# Patient Record
Sex: FEMALE | Race: WHITE | NOT HISPANIC OR LATINO | URBAN - METROPOLITAN AREA
[De-identification: names, ages, dates, MRNs, and addresses within clinical notes are randomized per-mention and may not be internally consistent; named-entity substitution may affect disease eponyms.]

---

## 2017-05-02 ENCOUNTER — OFFICE (AMBULATORY)
Dept: URBAN - METROPOLITAN AREA CLINIC 64 | Facility: CLINIC | Age: 37
End: 2017-05-02

## 2017-05-02 VITALS
WEIGHT: 123 LBS | HEIGHT: 63 IN | DIASTOLIC BLOOD PRESSURE: 64 MMHG | SYSTOLIC BLOOD PRESSURE: 107 MMHG | HEART RATE: 71 BPM

## 2017-05-02 DIAGNOSIS — K51.90 ULCERATIVE COLITIS, UNSPECIFIED, WITHOUT COMPLICATIONS: ICD-10-CM

## 2017-05-02 PROCEDURE — 99213 OFFICE O/P EST LOW 20 MIN: CPT | Performed by: INTERNAL MEDICINE

## 2019-04-16 ENCOUNTER — OFFICE (AMBULATORY)
Dept: URBAN - METROPOLITAN AREA CLINIC 64 | Facility: CLINIC | Age: 39
End: 2019-04-16

## 2019-04-16 VITALS
WEIGHT: 139 LBS | SYSTOLIC BLOOD PRESSURE: 118 MMHG | DIASTOLIC BLOOD PRESSURE: 77 MMHG | HEART RATE: 67 BPM | HEIGHT: 63 IN

## 2019-04-16 DIAGNOSIS — K51.90 ULCERATIVE COLITIS, UNSPECIFIED, WITHOUT COMPLICATIONS: ICD-10-CM

## 2019-04-16 PROCEDURE — 99214 OFFICE O/P EST MOD 30 MIN: CPT | Performed by: NURSE PRACTITIONER

## 2019-04-24 ENCOUNTER — ON CAMPUS - OUTPATIENT (AMBULATORY)
Dept: URBAN - METROPOLITAN AREA HOSPITAL 2 | Facility: HOSPITAL | Age: 39
End: 2019-04-24

## 2019-04-24 ENCOUNTER — OFFICE (AMBULATORY)
Dept: URBAN - METROPOLITAN AREA PATHOLOGY 4 | Facility: PATHOLOGY | Age: 39
End: 2019-04-24

## 2019-04-24 VITALS
HEART RATE: 92 BPM | HEART RATE: 82 BPM | SYSTOLIC BLOOD PRESSURE: 100 MMHG | DIASTOLIC BLOOD PRESSURE: 64 MMHG | RESPIRATION RATE: 18 BRPM | DIASTOLIC BLOOD PRESSURE: 66 MMHG | RESPIRATION RATE: 16 BRPM | TEMPERATURE: 97.9 F | DIASTOLIC BLOOD PRESSURE: 82 MMHG | HEART RATE: 68 BPM | HEART RATE: 75 BPM | HEART RATE: 78 BPM | HEART RATE: 79 BPM | DIASTOLIC BLOOD PRESSURE: 73 MMHG | DIASTOLIC BLOOD PRESSURE: 74 MMHG | SYSTOLIC BLOOD PRESSURE: 115 MMHG | SYSTOLIC BLOOD PRESSURE: 135 MMHG | HEART RATE: 89 BPM | SYSTOLIC BLOOD PRESSURE: 137 MMHG | OXYGEN SATURATION: 100 % | SYSTOLIC BLOOD PRESSURE: 112 MMHG | OXYGEN SATURATION: 98 % | HEIGHT: 63 IN | OXYGEN SATURATION: 97 % | SYSTOLIC BLOOD PRESSURE: 122 MMHG | SYSTOLIC BLOOD PRESSURE: 134 MMHG | WEIGHT: 137 LBS

## 2019-04-24 DIAGNOSIS — K51.90 ULCERATIVE COLITIS, UNSPECIFIED, WITHOUT COMPLICATIONS: ICD-10-CM

## 2019-04-24 DIAGNOSIS — K63.5 POLYP OF COLON: ICD-10-CM

## 2019-04-24 DIAGNOSIS — K64.1 SECOND DEGREE HEMORRHOIDS: ICD-10-CM

## 2019-04-24 PROBLEM — D12.4 BENIGN NEOPLASM OF DESCENDING COLON: Status: ACTIVE | Noted: 2019-04-24

## 2019-04-24 LAB
GI HISTOLOGY: A. SELECT: (no result)
GI HISTOLOGY: B. SELECT: (no result)
GI HISTOLOGY: C. UNSPECIFIED: (no result)
GI HISTOLOGY: PDF REPORT: (no result)

## 2019-04-24 PROCEDURE — 88305 TISSUE EXAM BY PATHOLOGIST: CPT | Performed by: INTERNAL MEDICINE

## 2019-04-24 PROCEDURE — 45380 COLONOSCOPY AND BIOPSY: CPT | Performed by: INTERNAL MEDICINE

## 2020-12-07 ENCOUNTER — OFFICE (AMBULATORY)
Dept: URBAN - METROPOLITAN AREA CLINIC 64 | Facility: CLINIC | Age: 40
End: 2020-12-07
Payer: COMMERCIAL

## 2020-12-07 VITALS
SYSTOLIC BLOOD PRESSURE: 130 MMHG | HEART RATE: 93 BPM | WEIGHT: 164 LBS | HEIGHT: 63 IN | DIASTOLIC BLOOD PRESSURE: 86 MMHG

## 2020-12-07 DIAGNOSIS — K52.89 OTHER SPECIFIED NONINFECTIVE GASTROENTERITIS AND COLITIS: ICD-10-CM

## 2020-12-07 DIAGNOSIS — K51.90 ULCERATIVE COLITIS, UNSPECIFIED, WITHOUT COMPLICATIONS: ICD-10-CM

## 2020-12-07 DIAGNOSIS — R10.30 LOWER ABDOMINAL PAIN, UNSPECIFIED: ICD-10-CM

## 2020-12-07 PROCEDURE — 99213 OFFICE O/P EST LOW 20 MIN: CPT | Performed by: NURSE PRACTITIONER

## 2020-12-07 RX ORDER — MESALAMINE 1000 MG/1
1 SUPPOSITORY RECTAL
Qty: 30 | Refills: 2 | Status: COMPLETED
Start: 2020-12-07 | End: 2022-08-01

## 2022-08-01 ENCOUNTER — OFFICE (AMBULATORY)
Dept: URBAN - METROPOLITAN AREA CLINIC 64 | Facility: CLINIC | Age: 42
End: 2022-08-01

## 2022-08-01 VITALS
HEART RATE: 81 BPM | SYSTOLIC BLOOD PRESSURE: 140 MMHG | WEIGHT: 149 LBS | DIASTOLIC BLOOD PRESSURE: 84 MMHG | HEIGHT: 63 IN

## 2022-08-01 DIAGNOSIS — K51.90 ULCERATIVE COLITIS, UNSPECIFIED, WITHOUT COMPLICATIONS: ICD-10-CM

## 2022-08-01 PROCEDURE — 99214 OFFICE O/P EST MOD 30 MIN: CPT

## 2022-08-01 RX ORDER — MESALAMINE 1000 MG/1
1 SUPPOSITORY RECTAL
Qty: 30 | Refills: 1 | Status: COMPLETED
Start: 2022-08-01 | End: 2023-01-13

## 2022-08-08 ENCOUNTER — APPOINTMENT (OUTPATIENT)
Dept: CT IMAGING | Facility: HOSPITAL | Age: 42
End: 2022-08-08

## 2022-08-08 ENCOUNTER — HOSPITAL ENCOUNTER (INPATIENT)
Facility: HOSPITAL | Age: 42
LOS: 1 days | Discharge: HOME | End: 2022-08-12
Attending: EMERGENCY MEDICINE | Admitting: INTERNAL MEDICINE

## 2022-08-08 DIAGNOSIS — K92.1 MELENA: ICD-10-CM

## 2022-08-08 DIAGNOSIS — R50.9 FEVER, UNSPECIFIED FEVER CAUSE: ICD-10-CM

## 2022-08-08 DIAGNOSIS — K52.9 COLITIS, ACUTE: ICD-10-CM

## 2022-08-08 DIAGNOSIS — R10.32 LLQ ABDOMINAL PAIN: Primary | ICD-10-CM

## 2022-08-08 LAB
ADV 40+41 DNA STL QL NAA+NON-PROBE: NOT DETECTED
ALBUMIN SERPL-MCNC: 4 G/DL (ref 3.5–5.2)
ALBUMIN/GLOB SERPL: 1 G/DL
ALP SERPL-CCNC: 91 U/L (ref 39–117)
ALT SERPL W P-5'-P-CCNC: 15 U/L (ref 1–33)
ANION GAP SERPL CALCULATED.3IONS-SCNC: 11 MMOL/L (ref 5–15)
AST SERPL-CCNC: 13 U/L (ref 1–32)
ASTRO TYP 1-8 RNA STL QL NAA+NON-PROBE: NOT DETECTED
BACTERIA UR QL AUTO: ABNORMAL /HPF
BASOPHILS # BLD AUTO: 0.1 10*3/MM3 (ref 0–0.2)
BASOPHILS NFR BLD AUTO: 0.3 % (ref 0–1.5)
BILIRUB SERPL-MCNC: 0.3 MG/DL (ref 0–1.2)
BILIRUB UR QL STRIP: NEGATIVE
BUN SERPL-MCNC: 6 MG/DL (ref 6–20)
BUN/CREAT SERPL: 7.8 (ref 7–25)
C CAYETANENSIS DNA STL QL NAA+NON-PROBE: NOT DETECTED
C COLI+JEJ+UPSA DNA STL QL NAA+NON-PROBE: NOT DETECTED
CALCIUM SPEC-SCNC: 9.5 MG/DL (ref 8.6–10.5)
CHLORIDE SERPL-SCNC: 98 MMOL/L (ref 98–107)
CLARITY UR: CLEAR
CO2 SERPL-SCNC: 27 MMOL/L (ref 22–29)
COLOR UR: YELLOW
CREAT SERPL-MCNC: 0.77 MG/DL (ref 0.57–1)
CRP SERPL-MCNC: 13.4 MG/DL (ref 0–0.5)
CRYPTOSP DNA STL QL NAA+NON-PROBE: NOT DETECTED
D-LACTATE SERPL-SCNC: 0.5 MMOL/L (ref 0.5–2)
DEPRECATED RDW RBC AUTO: 39.4 FL (ref 37–54)
E HISTOLYT DNA STL QL NAA+NON-PROBE: NOT DETECTED
EAEC PAA PLAS AGGR+AATA ST NAA+NON-PRB: NOT DETECTED
EC STX1+STX2 GENES STL QL NAA+NON-PROBE: NOT DETECTED
EGFRCR SERPLBLD CKD-EPI 2021: 99.5 ML/MIN/1.73
EOSINOPHIL # BLD AUTO: 0 10*3/MM3 (ref 0–0.4)
EOSINOPHIL NFR BLD AUTO: 0.2 % (ref 0.3–6.2)
EPEC EAE GENE STL QL NAA+NON-PROBE: NOT DETECTED
ERYTHROCYTE [DISTWIDTH] IN BLOOD BY AUTOMATED COUNT: 12.9 % (ref 12.3–15.4)
ERYTHROCYTE [SEDIMENTATION RATE] IN BLOOD: 40 MM/HR (ref 0–20)
ETEC LTA+ST1A+ST1B TOX ST NAA+NON-PROBE: NOT DETECTED
G LAMBLIA DNA STL QL NAA+NON-PROBE: NOT DETECTED
GLOBULIN UR ELPH-MCNC: 4 GM/DL
GLUCOSE SERPL-MCNC: 112 MG/DL (ref 65–99)
GLUCOSE UR STRIP-MCNC: NEGATIVE MG/DL
HCT VFR BLD AUTO: 43 % (ref 34–46.6)
HEMOCCULT STL QL IA: POSITIVE
HGB BLD-MCNC: 14.5 G/DL (ref 12–15.9)
HGB UR QL STRIP.AUTO: ABNORMAL
HOLD SPECIMEN: NORMAL
HOLD SPECIMEN: NORMAL
HYALINE CASTS UR QL AUTO: ABNORMAL /LPF
KETONES UR QL STRIP: ABNORMAL
LACTOFERRIN STL QL LA: NEGATIVE
LEUKOCYTE ESTERASE UR QL STRIP.AUTO: NEGATIVE
LIPASE SERPL-CCNC: 14 U/L (ref 13–60)
LYMPHOCYTES # BLD AUTO: 1.8 10*3/MM3 (ref 0.7–3.1)
LYMPHOCYTES NFR BLD AUTO: 9.6 % (ref 19.6–45.3)
MCH RBC QN AUTO: 28.7 PG (ref 26.6–33)
MCHC RBC AUTO-ENTMCNC: 33.6 G/DL (ref 31.5–35.7)
MCV RBC AUTO: 85.5 FL (ref 79–97)
MONOCYTES # BLD AUTO: 1 10*3/MM3 (ref 0.1–0.9)
MONOCYTES NFR BLD AUTO: 5.1 % (ref 5–12)
NEUTROPHILS NFR BLD AUTO: 16.2 10*3/MM3 (ref 1.7–7)
NEUTROPHILS NFR BLD AUTO: 84.8 % (ref 42.7–76)
NITRITE UR QL STRIP: NEGATIVE
NOROVIRUS GI+II RNA STL QL NAA+NON-PROBE: NOT DETECTED
NRBC BLD AUTO-RTO: 0 /100 WBC (ref 0–0.2)
P SHIGELLOIDES DNA STL QL NAA+NON-PROBE: NOT DETECTED
PH UR STRIP.AUTO: 7.5 [PH] (ref 5–8)
PLATELET # BLD AUTO: 528 10*3/MM3 (ref 140–450)
PMV BLD AUTO: 6.9 FL (ref 6–12)
POTASSIUM SERPL-SCNC: 4.4 MMOL/L (ref 3.5–5.2)
PROT SERPL-MCNC: 8 G/DL (ref 6–8.5)
PROT UR QL STRIP: ABNORMAL
RBC # BLD AUTO: 5.03 10*6/MM3 (ref 3.77–5.28)
RBC # UR STRIP: ABNORMAL /HPF
REF LAB TEST METHOD: ABNORMAL
RVA RNA STL QL NAA+NON-PROBE: NOT DETECTED
S ENT+BONG DNA STL QL NAA+NON-PROBE: NOT DETECTED
SAPO I+II+IV+V RNA STL QL NAA+NON-PROBE: NOT DETECTED
SARS-COV-2 RNA RESP QL NAA+PROBE: NOT DETECTED
SHIGELLA SP+EIEC IPAH ST NAA+NON-PROBE: NOT DETECTED
SODIUM SERPL-SCNC: 136 MMOL/L (ref 136–145)
SP GR UR STRIP: 1.01 (ref 1–1.03)
SQUAMOUS #/AREA URNS HPF: ABNORMAL /HPF
UROBILINOGEN UR QL STRIP: ABNORMAL
V CHOL+PARA+VUL DNA STL QL NAA+NON-PROBE: NOT DETECTED
V CHOLERAE DNA STL QL NAA+NON-PROBE: NOT DETECTED
WBC # UR STRIP: ABNORMAL /HPF
WBC NRBC COR # BLD: 19.1 10*3/MM3 (ref 3.4–10.8)
WHOLE BLOOD HOLD SPECIMEN: NORMAL
Y ENTEROCOL DNA STL QL NAA+NON-PROBE: NOT DETECTED

## 2022-08-08 PROCEDURE — 86140 C-REACTIVE PROTEIN: CPT | Performed by: NURSE PRACTITIONER

## 2022-08-08 PROCEDURE — 25010000002 HYDROMORPHONE 1 MG/ML SOLUTION: Performed by: NURSE PRACTITIONER

## 2022-08-08 PROCEDURE — 87040 BLOOD CULTURE FOR BACTERIA: CPT | Performed by: NURSE PRACTITIONER

## 2022-08-08 PROCEDURE — 25010000002 METHYLPREDNISOLONE PER 40 MG: Performed by: INTERNAL MEDICINE

## 2022-08-08 PROCEDURE — 81001 URINALYSIS AUTO W/SCOPE: CPT | Performed by: NURSE PRACTITIONER

## 2022-08-08 PROCEDURE — 36415 COLL VENOUS BLD VENIPUNCTURE: CPT

## 2022-08-08 PROCEDURE — U0003 INFECTIOUS AGENT DETECTION BY NUCLEIC ACID (DNA OR RNA); SEVERE ACUTE RESPIRATORY SYNDROME CORONAVIRUS 2 (SARS-COV-2) (CORONAVIRUS DISEASE [COVID-19]), AMPLIFIED PROBE TECHNIQUE, MAKING USE OF HIGH THROUGHPUT TECHNOLOGIES AS DESCRIBED BY CMS-2020-01-R: HCPCS | Performed by: EMERGENCY MEDICINE

## 2022-08-08 PROCEDURE — G0378 HOSPITAL OBSERVATION PER HR: HCPCS

## 2022-08-08 PROCEDURE — 25010000002 HYDROMORPHONE 1 MG/ML SOLUTION: Performed by: INTERNAL MEDICINE

## 2022-08-08 PROCEDURE — 99284 EMERGENCY DEPT VISIT MOD MDM: CPT

## 2022-08-08 PROCEDURE — 83630 LACTOFERRIN FECAL (QUAL): CPT | Performed by: NURSE PRACTITIONER

## 2022-08-08 PROCEDURE — 85025 COMPLETE CBC W/AUTO DIFF WBC: CPT | Performed by: INTERNAL MEDICINE

## 2022-08-08 PROCEDURE — 0 IOPAMIDOL PER 1 ML: Performed by: EMERGENCY MEDICINE

## 2022-08-08 PROCEDURE — 83690 ASSAY OF LIPASE: CPT | Performed by: NURSE PRACTITIONER

## 2022-08-08 PROCEDURE — 85025 COMPLETE CBC W/AUTO DIFF WBC: CPT | Performed by: NURSE PRACTITIONER

## 2022-08-08 PROCEDURE — 82274 ASSAY TEST FOR BLOOD FECAL: CPT | Performed by: EMERGENCY MEDICINE

## 2022-08-08 PROCEDURE — 80053 COMPREHEN METABOLIC PANEL: CPT | Performed by: NURSE PRACTITIONER

## 2022-08-08 PROCEDURE — 25010000002 DIPHENHYDRAMINE PER 50 MG: Performed by: NURSE PRACTITIONER

## 2022-08-08 PROCEDURE — 25010000002 ONDANSETRON PER 1 MG: Performed by: NURSE PRACTITIONER

## 2022-08-08 PROCEDURE — 74177 CT ABD & PELVIS W/CONTRAST: CPT

## 2022-08-08 PROCEDURE — 83605 ASSAY OF LACTIC ACID: CPT

## 2022-08-08 PROCEDURE — 25010000002 METOCLOPRAMIDE PER 10 MG: Performed by: NURSE PRACTITIONER

## 2022-08-08 PROCEDURE — 25010000002 LEVOFLOXACIN PER 250 MG: Performed by: NURSE PRACTITIONER

## 2022-08-08 PROCEDURE — 85652 RBC SED RATE AUTOMATED: CPT | Performed by: NURSE PRACTITIONER

## 2022-08-08 PROCEDURE — 87507 IADNA-DNA/RNA PROBE TQ 12-25: CPT | Performed by: NURSE PRACTITIONER

## 2022-08-08 RX ORDER — LEVOFLOXACIN 5 MG/ML
750 INJECTION, SOLUTION INTRAVENOUS ONCE
Status: COMPLETED | OUTPATIENT
Start: 2022-08-08 | End: 2022-08-08

## 2022-08-08 RX ORDER — BUPROPION HYDROCHLORIDE 150 MG/1
150 TABLET ORAL DAILY
COMMUNITY

## 2022-08-08 RX ORDER — ACETAMINOPHEN 500 MG
1000 TABLET ORAL ONCE
Status: COMPLETED | OUTPATIENT
Start: 2022-08-08 | End: 2022-08-08

## 2022-08-08 RX ORDER — METRONIDAZOLE 500 MG/100ML
500 INJECTION, SOLUTION INTRAVENOUS ONCE
Status: COMPLETED | OUTPATIENT
Start: 2022-08-08 | End: 2022-08-08

## 2022-08-08 RX ORDER — METOCLOPRAMIDE HYDROCHLORIDE 5 MG/ML
10 INJECTION INTRAMUSCULAR; INTRAVENOUS ONCE
Status: COMPLETED | OUTPATIENT
Start: 2022-08-08 | End: 2022-08-08

## 2022-08-08 RX ORDER — DIPHENHYDRAMINE HYDROCHLORIDE 50 MG/ML
25 INJECTION INTRAMUSCULAR; INTRAVENOUS ONCE
Status: COMPLETED | OUTPATIENT
Start: 2022-08-08 | End: 2022-08-08

## 2022-08-08 RX ORDER — POTASSIUM CHLORIDE 1.5 G/1.77G
40 POWDER, FOR SOLUTION ORAL AS NEEDED
Status: DISCONTINUED | OUTPATIENT
Start: 2022-08-08 | End: 2022-08-12 | Stop reason: HOSPADM

## 2022-08-08 RX ORDER — ALPRAZOLAM 0.25 MG/1
0.25 TABLET ORAL NIGHTLY PRN
COMMUNITY

## 2022-08-08 RX ORDER — METHYLPREDNISOLONE SODIUM SUCCINATE 40 MG/ML
20 INJECTION, POWDER, LYOPHILIZED, FOR SOLUTION INTRAMUSCULAR; INTRAVENOUS EVERY 12 HOURS
Status: DISCONTINUED | OUTPATIENT
Start: 2022-08-08 | End: 2022-08-09

## 2022-08-08 RX ORDER — SODIUM CHLORIDE 0.9 % (FLUSH) 0.9 %
10 SYRINGE (ML) INJECTION AS NEEDED
Status: DISCONTINUED | OUTPATIENT
Start: 2022-08-08 | End: 2022-08-12 | Stop reason: HOSPADM

## 2022-08-08 RX ORDER — FAMOTIDINE 20 MG/1
40 TABLET, FILM COATED ORAL DAILY
Status: DISCONTINUED | OUTPATIENT
Start: 2022-08-08 | End: 2022-08-12 | Stop reason: HOSPADM

## 2022-08-08 RX ORDER — BUDESONIDE 3 MG/1
9 CAPSULE, COATED PELLETS ORAL EVERY MORNING
COMMUNITY

## 2022-08-08 RX ORDER — PROMETHAZINE HYDROCHLORIDE 25 MG/1
25 TABLET ORAL EVERY 6 HOURS PRN
Status: DISCONTINUED | OUTPATIENT
Start: 2022-08-08 | End: 2022-08-12 | Stop reason: HOSPADM

## 2022-08-08 RX ORDER — ONDANSETRON 2 MG/ML
4 INJECTION INTRAMUSCULAR; INTRAVENOUS ONCE
Status: COMPLETED | OUTPATIENT
Start: 2022-08-08 | End: 2022-08-08

## 2022-08-08 RX ORDER — POTASSIUM CHLORIDE 20 MEQ/1
40 TABLET, EXTENDED RELEASE ORAL AS NEEDED
Status: DISCONTINUED | OUTPATIENT
Start: 2022-08-08 | End: 2022-08-12 | Stop reason: HOSPADM

## 2022-08-08 RX ORDER — DICYCLOMINE HYDROCHLORIDE 10 MG/1
20 CAPSULE ORAL 4 TIMES DAILY
Status: DISCONTINUED | OUTPATIENT
Start: 2022-08-08 | End: 2022-08-09

## 2022-08-08 RX ORDER — ACETAMINOPHEN 325 MG/1
650 TABLET ORAL EVERY 4 HOURS PRN
Status: DISCONTINUED | OUTPATIENT
Start: 2022-08-08 | End: 2022-08-12 | Stop reason: HOSPADM

## 2022-08-08 RX ORDER — DICYCLOMINE HCL 20 MG
20 TABLET ORAL EVERY 6 HOURS
COMMUNITY

## 2022-08-08 RX ORDER — SODIUM CHLORIDE 0.9 % (FLUSH) 0.9 %
3-10 SYRINGE (ML) INJECTION AS NEEDED
Status: DISCONTINUED | OUTPATIENT
Start: 2022-08-08 | End: 2022-08-12 | Stop reason: HOSPADM

## 2022-08-08 RX ORDER — SODIUM CHLORIDE 0.9 % (FLUSH) 0.9 %
3 SYRINGE (ML) INJECTION EVERY 12 HOURS SCHEDULED
Status: DISCONTINUED | OUTPATIENT
Start: 2022-08-08 | End: 2022-08-12 | Stop reason: HOSPADM

## 2022-08-08 RX ORDER — SODIUM CHLORIDE 9 MG/ML
100 INJECTION, SOLUTION INTRAVENOUS CONTINUOUS
Status: DISCONTINUED | OUTPATIENT
Start: 2022-08-08 | End: 2022-08-12 | Stop reason: HOSPADM

## 2022-08-08 RX ORDER — MESALAMINE 4 G/60ML
1 ENEMA RECTAL NIGHTLY
Status: ON HOLD | COMMUNITY
End: 2022-08-11

## 2022-08-08 RX ADMIN — HYDROMORPHONE HYDROCHLORIDE 0.5 MG: 1 INJECTION, SOLUTION INTRAMUSCULAR; INTRAVENOUS; SUBCUTANEOUS at 17:51

## 2022-08-08 RX ADMIN — PIPERACILLIN AND TAZOBACTAM 3.38 G: 3; .375 INJECTION, POWDER, LYOPHILIZED, FOR SOLUTION INTRAVENOUS at 23:44

## 2022-08-08 RX ADMIN — METRONIDAZOLE 500 MG: 500 INJECTION, SOLUTION INTRAVENOUS at 17:31

## 2022-08-08 RX ADMIN — DICYCLOMINE HYDROCHLORIDE 20 MG: 10 CAPSULE ORAL at 21:29

## 2022-08-08 RX ADMIN — IOPAMIDOL 100 ML: 755 INJECTION, SOLUTION INTRAVENOUS at 15:18

## 2022-08-08 RX ADMIN — ONDANSETRON 4 MG: 2 INJECTION INTRAMUSCULAR; INTRAVENOUS at 13:07

## 2022-08-08 RX ADMIN — SODIUM CHLORIDE, POTASSIUM CHLORIDE, SODIUM LACTATE AND CALCIUM CHLORIDE 1000 ML: 600; 310; 30; 20 INJECTION, SOLUTION INTRAVENOUS at 13:07

## 2022-08-08 RX ADMIN — FAMOTIDINE 40 MG: 20 TABLET ORAL at 21:29

## 2022-08-08 RX ADMIN — METOCLOPRAMIDE 10 MG: 5 INJECTION, SOLUTION INTRAMUSCULAR; INTRAVENOUS at 17:51

## 2022-08-08 RX ADMIN — LEVOFLOXACIN 750 MG: 5 INJECTION, SOLUTION INTRAVENOUS at 19:07

## 2022-08-08 RX ADMIN — DIPHENHYDRAMINE HYDROCHLORIDE 25 MG: 50 INJECTION, SOLUTION INTRAMUSCULAR; INTRAVENOUS at 17:51

## 2022-08-08 RX ADMIN — HYDROMORPHONE HYDROCHLORIDE 1 MG: 1 INJECTION, SOLUTION INTRAMUSCULAR; INTRAVENOUS; SUBCUTANEOUS at 13:07

## 2022-08-08 RX ADMIN — HYDROMORPHONE HYDROCHLORIDE 0.5 MG: 1 INJECTION, SOLUTION INTRAMUSCULAR; INTRAVENOUS; SUBCUTANEOUS at 23:43

## 2022-08-08 RX ADMIN — SODIUM CHLORIDE 100 ML/HR: 9 INJECTION, SOLUTION INTRAVENOUS at 21:30

## 2022-08-08 RX ADMIN — Medication 3 ML: at 21:30

## 2022-08-08 RX ADMIN — METHYLPREDNISOLONE SODIUM SUCCINATE 20 MG: 40 INJECTION, POWDER, FOR SOLUTION INTRAMUSCULAR; INTRAVENOUS at 21:29

## 2022-08-08 RX ADMIN — ACETAMINOPHEN 1000 MG: 500 TABLET ORAL at 17:52

## 2022-08-08 NOTE — CASE MANAGEMENT/SOCIAL WORK
Discharge Planning Assessment  [facility]     Patient Name: Sanam Tijerina  MRN: [id number]  Today's Date: 8/8/2022    Admit Date: 8/8/2022     Discharge Needs Assessment     Row Name 08/08/22 1757       Living Environment    People in Home spouse;child(errol), dependent    Current Living Arrangements home    Primary Care Provided by self    Provides Primary Care For child(errol)    Family Caregiver if Needed none    Able to Return to Prior Arrangements yes       Resource/Environmental Concerns    Resource/Environmental Concerns none    Transportation Concerns none       Transition Planning    Patient/Family Anticipates Transition to home with family    Patient/Family Anticipated Services at Transition none    Transportation Anticipated family or friend will provide       Discharge Needs Assessment    Readmission Within the Last 30 Days no previous admission in last 30 days    Equipment Currently Used at Home none    Concerns to be Addressed denies needs/concerns at this time;no discharge needs identified    Anticipated Changes Related to Illness none    Equipment Needed After Discharge none               Discharge Plan     Row Name 08/08/22 1752       Plan    Plan Anticipate Routine home    Patient/Family in Agreement with Plan yes    Plan Comments Met with PAtient at bedside Lives at home with Family.  will provide transportation. IADL's PCP and Pharmacy verified, able to afford medications. Denies any needs. d/c barriers: IV ATB, GO Consult                  Expected Discharge Date and Time     Expected Discharge Date Expected Discharge Time    Aug 9, 2022          Demographic Summary     Row Name 08/08/22 1757       General Information    Admission Type other (see comments)  pending status order    Arrived From emergency department    Referral Source admission list    Reason for Consult discharge planning    Preferred Language English               Functional Status     Row Name 08/08/22 1757       Functional  Status    Usual Activity Tolerance good    Current Activity Tolerance good       Functional Status, IADL    Medications independent    Meal Preparation independent    Housekeeping independent    Laundry independent    Shopping independent       Mental Status    General Appearance WDL WDL       Mental Status Summary    Recent Changes in Mental Status/Cognitive Functioning no changes              Met with patient at bedside wearing mask and goggles, Spent less than 15 minutes in room at greater than 6 feet distance.              Judy Farooq RN

## 2022-08-08 NOTE — ED PROVIDER NOTES
Subjective   42-year-old female presents with a 2 week history of left lower quadrant pain that radiates to the left flank.  She reports ulcerative colitis flare  that she was seen by GI and started on budesonide without relief.  She denies fever chills but reports sweats.  She also reports melena nausea vomiting.  She denies hematemesis.    1. Location: LL  2. Quality: Shooting  3. Severity: Moderate to severe  4. Worsening factors: Bowel Movement  5. Alleviating factors: Denies  6. Onset: 2 weeks  7. Radiation: Left flank  8. Frequency: Constant with periods of intensity  9. Co-morbidities: Ulcerative colitis, anxiety.  10. Source: Patient            Review of Systems   Constitutional: Positive for appetite change, diaphoresis and fever. Negative for chills.   HENT: Negative for postnasal drip.    Respiratory: Negative for shortness of breath.    Cardiovascular: Negative for chest pain.   Gastrointestinal: Positive for abdominal pain, blood in stool, nausea and vomiting. Negative for constipation and diarrhea.   Genitourinary: Negative for decreased urine volume, difficulty urinating, flank pain and hematuria.   Skin: Negative for color change, pallor and rash.   Allergic/Immunologic: Positive for immunocompromised state.   Hematological: Negative for adenopathy.   Psychiatric/Behavioral: Negative for confusion.   All other systems reviewed and are negative.      No past medical history on file.    Allergies   Allergen Reactions   • Flagyl [Metronidazole] GI Intolerance       No past surgical history on file.    No family history on file.    Social History     Socioeconomic History   • Marital status:            Objective   Physical Exam  Vitals and nursing note reviewed.   Constitutional:       General: She is awake. She is not in acute distress.     Appearance: Normal appearance. She is well-developed. She is not ill-appearing or toxic-appearing.   HENT:      Mouth/Throat:      Mouth: Mucous membranes  are moist.      Pharynx: Oropharynx is clear.   Eyes:      General: No scleral icterus.     Extraocular Movements: Extraocular movements intact.      Pupils: Pupils are equal, round, and reactive to light.   Cardiovascular:      Rate and Rhythm: Regular rhythm. Tachycardia present.      Heart sounds: Normal heart sounds, S1 normal and S2 normal. Heart sounds not distant. No murmur heard.    No friction rub. No gallop.   Pulmonary:      Effort: Pulmonary effort is normal.      Breath sounds: Normal breath sounds and air entry.   Abdominal:      General: Abdomen is flat. Bowel sounds are normal. There is no distension.      Palpations: Abdomen is soft. There is no mass.      Tenderness: There is abdominal tenderness in the left lower quadrant. There is left CVA tenderness. There is no right CVA tenderness, guarding or rebound.      Hernia: No hernia is present.       Skin:     General: Skin is warm and dry.      Capillary Refill: Capillary refill takes less than 2 seconds.      Coloration: Skin is not jaundiced or pale.      Findings: No rash.   Neurological:      Mental Status: She is alert and oriented to person, place, and time.      GCS: GCS eye subscore is 4. GCS verbal subscore is 5. GCS motor subscore is 6.   Psychiatric:         Mood and Affect: Mood normal.         Behavior: Behavior normal. Behavior is cooperative.         Thought Content: Thought content normal.         Judgment: Judgment normal.         Procedures           ED Course  ED Course as of 08/08/22 1647   Mon Aug 08, 2022   1508 Awaiting patient to go to CT. [AL]      ED Course User Index  [AL] Celi Miles APRN    CT Abdomen Pelvis With Contrast    Result Date: 8/8/2022   1. FINDINGS consistent with diffuse infectious/inflammatory colitis of the mid to distal transverse colon through the rectum. Similar distribution of colon inflammation can be seen in cases of ulcerative colitis. Correlate clinically. No evidence of bowel obstruction. 2.  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    Electronically Signed By-Joanne Mustafa MD On:8/8/2022 3:37 PM This report was finalized on [id number] by  Joanne Mustafa MD.    Medications   sodium chloride 0.9 % flush 10 mL (has no administration in time range)   levoFLOXacin (LEVAQUIN) 750 mg/150 mL D5W (premix) (LEVAQUIN) 750 mg (has no administration in time range)   metroNIDAZOLE (FLAGYL) IVPB 500 mg (has no administration in time range)   lactated ringers bolus 1,000 mL (1,000 mL Intravenous New Bag 8/8/22 1307)   HYDROmorphone (DILAUDID) injection 1 mg (1 mg Intravenous Given 8/8/22 1307)   ondansetron (ZOFRAN) injection 4 mg (4 mg Intravenous Given 8/8/22 1307)   iopamidol (ISOVUE-370) 76 % injection 100 mL (100 mL Intravenous Given 8/8/22 1518)     Labs Reviewed   COMPREHENSIVE METABOLIC PANEL - Abnormal; Notable for the following components:       Result Value    Glucose 112 (*)     All other components within normal limits    Narrative:     GFR Normal >60  Chronic Kidney Disease <60  Kidney Failure <15     URINALYSIS W/ MICROSCOPIC IF INDICATED (NO CULTURE) - Abnormal; Notable for the following components:    Ketones, UA 40 mg/dL (2+) (*)     Blood, UA Trace (*)     Protein, UA Trace (*)     All other components within normal limits   SEDIMENTATION RATE - Abnormal; Notable for the following components:    Sed Rate 40 (*)     All other components within normal limits   C-REACTIVE PROTEIN - Abnormal; Notable for the following components:    C-Reactive Protein 13.40 (*)     All other components within normal limits   CBC WITH AUTO DIFFERENTIAL - Abnormal; Notable for the following components:    WBC 19.10 (*)     Platelets 528 (*)     Neutrophil % 84.8 (*)     Lymphocyte % 9.6 (*)     Eosinophil % 0.2 (*)     Neutrophils, Absolute 16.20 (*)     Monocytes, Absolute 1.00 (*)     All other components within normal limits   OCCULT BLOOD, FECAL BY IMMUNOASSAY - Abnormal; Notable  for the following components:    Occult Blood, Fecal by Immunoassay Positive (*)     All other components within normal limits   URINALYSIS, MICROSCOPIC ONLY - Abnormal; Notable for the following components:    RBC, UA 6-12 (*)     WBC, UA 0-2 (*)     All other components within normal limits   GASTROINTESTINAL PANEL, PCR - Normal    Narrative:     If Aeromonas, Staphylococcus aureus or Bacillus cereus are suspected, please order [id number]: Stool Culture, Aeromonas, S aureus, B Cereus.   LIPASE - Normal   FECAL LACTOFERRIN QUAL. - Normal   POC LACTATE - Normal   BLOOD CULTURE   BLOOD CULTURE   COVID PRE-OP / PRE-PROCEDURE SCREENING ORDER (NO ISOLATION)    Narrative:     The following orders were created for panel order COVID PRE-OP / PRE-PROCEDURE SCREENING ORDER (NO ISOLATION) - Swab, Nasopharynx.  Procedure                               Abnormality         Status                     ---------                               -----------         ------                     COVID-19,CEPHEID/BALA,CO...[[id number]]                                                   Please view results for these tests on the individual orders.   COVID-19,CEPHEID/BALA,COR/PEG/PAD/OZIEL IN-HOUSE,NP SWAB IN TRANSPORT MEDIA 3-4 HR TAT, RT-PCR   RAINBOW DRAW    Narrative:     The following orders were created for panel order [place] Draw.  Procedure                               Abnormality         Status                     ---------                               -----------         ------                     Green Top (Gel)[[id number]]                                  Final result               Lavender Top[[id number]]                                     Final result               Gold Top - SST[[id number]]                                   Final result               Light Blue Top[[id number]]                                                                Please view results for these tests on the individual orders.   POC LACTATE   CBC AND DIFFERENTIAL     Narrative:     The following orders were created for panel order CBC & Differential.  Procedure                               Abnormality         Status                     ---------                               -----------         ------                     CBC Auto Differential[[id number]]        Abnormal            Final result                 Please view results for these tests on the individual orders.   GREEN TOP   LAVENDER TOP   GOLD TOP - SST                                            MDM  Number of Diagnoses or Management Options  Colitis, acute  Fever, unspecified fever cause  LLQ abdominal pain  Melena  Diagnosis management comments: Chart Review: Nothing available for comparison.  Comorbidity: Ulcerative colitis, anxiety.  Imaging: Was interpreted by physician and reviewed by myself: CT Abdomen Pelvis With Contrast   Final Result         1. FINDINGS consistent with diffuse infectious/inflammatory colitis of    the mid to distal transverse colon through the rectum. Similar    distribution of colon inflammation can be seen in cases of ulcerative    colitis. Correlate clinically. No evidence of bowel obstruction.    2. 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          Electronically Signed By-Joanne Mustafa MD On:8/8/2022 3:37 PM    This report was finalized on [id number] by  Joanne Mustafa MD.    Patient undressed and placed in gown for exam.  Appropriate PPE worn during patient exam.  Patient is alert and oriented x3.  She has been mild pain distress.  IV established and labs obtained.  Patient was given lactated Ringer's 1 L bolus, Dilaudid 1 mg IV, and Zofran 4 mg IV.  CT the abdomen pelvis with IV contrast obtained to the above findings. White blood cell count 19,100 with a left shift platelets 528 hemoglobin 14.5 hematocrit 43.0 CMP essentially unremarkable.  Urine shows no signs of infection.  GI panel negative.  Fecal occult positive.  CRP elevated at  13.4 sed rate elevated at 40.  Upon reassessment, patient reports relief with medications given, but remains feeling weak.  CT was significant for colitis, no abscess or perforation.  Patient was given Levaquin and Flagyl IV.  GI consult placed.  Dr. Schmidt's answering service was paged.  Spoke with Dr. Marshall who accepted admission.    Disposition/Treatment: Discussed results with patient, verbalized understanding. Agreeable with plan of care.  Patient was stable upon admission.       Part of this note may be an electronic transcription/translation of spoken language to printed text using the Dragon Dictation System.            Amount and/or Complexity of Data Reviewed  Clinical lab tests: reviewed  Tests in the radiology section of CPT®: reviewed    Patient Progress  Patient progress: stable      Final diagnoses:   LLQ abdominal pain   Colitis, acute   Melena   Fever, unspecified fever cause       ED Disposition  ED Disposition     ED Disposition   Decision to Admit    Condition   --    Comment   Level of Care: Med/Surg [1]   Admitting Physician: VICKIE MARSHALL [[id number]]   Attending Physician: VICKIE MARSHALL [[id number]]               No follow-up provider specified.       Medication List      No changes were made to your prescriptions during this visit.          Celi Miles, APRN  08/08/22 9996

## 2022-08-08 NOTE — ED NOTES
Pt states she has a hx of UC. PT c/o abdominal pain, weakness, nausea, and multiple bowel movements a day. PT denies any cp, soa, or fevers.

## 2022-08-09 ENCOUNTER — ON CAMPUS - OUTPATIENT (AMBULATORY)
Dept: URBAN - METROPOLITAN AREA HOSPITAL 85 | Facility: HOSPITAL | Age: 42
End: 2022-08-09

## 2022-08-09 DIAGNOSIS — K51.90 ULCERATIVE COLITIS, UNSPECIFIED, WITHOUT COMPLICATIONS: ICD-10-CM

## 2022-08-09 DIAGNOSIS — D72.829 ELEVATED WHITE BLOOD CELL COUNT, UNSPECIFIED: ICD-10-CM

## 2022-08-09 LAB
ANION GAP SERPL CALCULATED.3IONS-SCNC: 10 MMOL/L (ref 5–15)
BASOPHILS # BLD AUTO: 0 10*3/MM3 (ref 0–0.2)
BASOPHILS NFR BLD AUTO: 0.1 % (ref 0–1.5)
BUN SERPL-MCNC: 6 MG/DL (ref 6–20)
BUN/CREAT SERPL: 8.2 (ref 7–25)
CALCIUM SPEC-SCNC: 8.5 MG/DL (ref 8.6–10.5)
CHLORIDE SERPL-SCNC: 99 MMOL/L (ref 98–107)
CO2 SERPL-SCNC: 27 MMOL/L (ref 22–29)
CREAT SERPL-MCNC: 0.73 MG/DL (ref 0.57–1)
DEPRECATED RDW RBC AUTO: 39.8 FL (ref 37–54)
EGFRCR SERPLBLD CKD-EPI 2021: 106.1 ML/MIN/1.73
EOSINOPHIL # BLD AUTO: 0.1 10*3/MM3 (ref 0–0.4)
EOSINOPHIL NFR BLD AUTO: 0.6 % (ref 0.3–6.2)
ERYTHROCYTE [DISTWIDTH] IN BLOOD BY AUTOMATED COUNT: 13 % (ref 12.3–15.4)
GLUCOSE SERPL-MCNC: 104 MG/DL (ref 65–99)
HCT VFR BLD AUTO: 38.6 % (ref 34–46.6)
HGB BLD-MCNC: 12.6 G/DL (ref 12–15.9)
LYMPHOCYTES # BLD AUTO: 2 10*3/MM3 (ref 0.7–3.1)
LYMPHOCYTES NFR BLD AUTO: 9.1 % (ref 19.6–45.3)
MCH RBC QN AUTO: 28.3 PG (ref 26.6–33)
MCHC RBC AUTO-ENTMCNC: 32.7 G/DL (ref 31.5–35.7)
MCV RBC AUTO: 86.5 FL (ref 79–97)
MONOCYTES # BLD AUTO: 0.6 10*3/MM3 (ref 0.1–0.9)
MONOCYTES NFR BLD AUTO: 2.7 % (ref 5–12)
NEUTROPHILS NFR BLD AUTO: 19.6 10*3/MM3 (ref 1.7–7)
NEUTROPHILS NFR BLD AUTO: 87.5 % (ref 42.7–76)
NRBC BLD AUTO-RTO: 0.2 /100 WBC (ref 0–0.2)
PLATELET # BLD AUTO: 456 10*3/MM3 (ref 140–450)
PMV BLD AUTO: 7.3 FL (ref 6–12)
POTASSIUM SERPL-SCNC: 4.2 MMOL/L (ref 3.5–5.2)
RBC # BLD AUTO: 4.46 10*6/MM3 (ref 3.77–5.28)
SODIUM SERPL-SCNC: 136 MMOL/L (ref 136–145)
WBC NRBC COR # BLD: 22.4 10*3/MM3 (ref 3.4–10.8)

## 2022-08-09 PROCEDURE — 25010000002 METHYLPREDNISOLONE PER 40 MG: Performed by: NURSE PRACTITIONER

## 2022-08-09 PROCEDURE — 99213 OFFICE O/P EST LOW 20 MIN: CPT | Performed by: NURSE PRACTITIONER

## 2022-08-09 PROCEDURE — 63710000001 PROMETHAZINE PER 25 MG: Performed by: INTERNAL MEDICINE

## 2022-08-09 PROCEDURE — G0378 HOSPITAL OBSERVATION PER HR: HCPCS

## 2022-08-09 PROCEDURE — 25010000002 PIPERACILLIN SOD-TAZOBACTAM PER 1 G: Performed by: INTERNAL MEDICINE

## 2022-08-09 PROCEDURE — 25010000002 HYDROMORPHONE 1 MG/ML SOLUTION: Performed by: INTERNAL MEDICINE

## 2022-08-09 RX ORDER — BUDESONIDE 3 MG/1
9 CAPSULE, COATED PELLETS ORAL EVERY MORNING
Status: DISCONTINUED | OUTPATIENT
Start: 2022-08-09 | End: 2022-08-09

## 2022-08-09 RX ORDER — MESALAMINE 1.2 G/1
2.4 TABLET, DELAYED RELEASE ORAL 2 TIMES DAILY
Status: DISCONTINUED | OUTPATIENT
Start: 2022-08-09 | End: 2022-08-12 | Stop reason: HOSPADM

## 2022-08-09 RX ORDER — DICYCLOMINE HYDROCHLORIDE 10 MG/1
20 CAPSULE ORAL 4 TIMES DAILY
Status: DISCONTINUED | OUTPATIENT
Start: 2022-08-09 | End: 2022-08-12 | Stop reason: HOSPADM

## 2022-08-09 RX ORDER — METRONIDAZOLE 500 MG/100ML
500 INJECTION, SOLUTION INTRAVENOUS EVERY 8 HOURS
Status: DISCONTINUED | OUTPATIENT
Start: 2022-08-09 | End: 2022-08-12

## 2022-08-09 RX ORDER — BUPROPION HYDROCHLORIDE 150 MG/1
150 TABLET ORAL DAILY
Status: DISCONTINUED | OUTPATIENT
Start: 2022-08-09 | End: 2022-08-12 | Stop reason: HOSPADM

## 2022-08-09 RX ORDER — METHYLPREDNISOLONE SODIUM SUCCINATE 40 MG/ML
20 INJECTION, POWDER, LYOPHILIZED, FOR SOLUTION INTRAMUSCULAR; INTRAVENOUS EVERY 8 HOURS
Status: DISCONTINUED | OUTPATIENT
Start: 2022-08-09 | End: 2022-08-12 | Stop reason: HOSPADM

## 2022-08-09 RX ORDER — ALPRAZOLAM 0.25 MG/1
0.25 TABLET ORAL NIGHTLY PRN
Status: DISCONTINUED | OUTPATIENT
Start: 2022-08-09 | End: 2022-08-12 | Stop reason: HOSPADM

## 2022-08-09 RX ADMIN — PIPERACILLIN AND TAZOBACTAM 3.38 G: 3; .375 INJECTION, POWDER, LYOPHILIZED, FOR SOLUTION INTRAVENOUS at 05:27

## 2022-08-09 RX ADMIN — HYDROMORPHONE HYDROCHLORIDE 0.5 MG: 1 INJECTION, SOLUTION INTRAMUSCULAR; INTRAVENOUS; SUBCUTANEOUS at 05:26

## 2022-08-09 RX ADMIN — SODIUM CHLORIDE 100 ML/HR: 9 INJECTION, SOLUTION INTRAVENOUS at 14:12

## 2022-08-09 RX ADMIN — BUDESONIDE 9 MG: 3 CAPSULE ORAL at 08:44

## 2022-08-09 RX ADMIN — DICYCLOMINE HYDROCHLORIDE 20 MG: 10 CAPSULE ORAL at 19:57

## 2022-08-09 RX ADMIN — DICYCLOMINE HYDROCHLORIDE 20 MG: 10 CAPSULE ORAL at 12:01

## 2022-08-09 RX ADMIN — HYDROMORPHONE HYDROCHLORIDE 0.5 MG: 1 INJECTION, SOLUTION INTRAMUSCULAR; INTRAVENOUS; SUBCUTANEOUS at 14:20

## 2022-08-09 RX ADMIN — DICYCLOMINE HYDROCHLORIDE 20 MG: 10 CAPSULE ORAL at 17:39

## 2022-08-09 RX ADMIN — HYDROMORPHONE HYDROCHLORIDE 0.5 MG: 1 INJECTION, SOLUTION INTRAMUSCULAR; INTRAVENOUS; SUBCUTANEOUS at 17:39

## 2022-08-09 RX ADMIN — METRONIDAZOLE 500 MG: 500 INJECTION, SOLUTION INTRAVENOUS at 11:01

## 2022-08-09 RX ADMIN — PROMETHAZINE HYDROCHLORIDE 25 MG: 25 TABLET ORAL at 20:00

## 2022-08-09 RX ADMIN — HYDROMORPHONE HYDROCHLORIDE 0.5 MG: 1 INJECTION, SOLUTION INTRAMUSCULAR; INTRAVENOUS; SUBCUTANEOUS at 08:49

## 2022-08-09 RX ADMIN — METHYLPREDNISOLONE SODIUM SUCCINATE 20 MG: 40 INJECTION, POWDER, FOR SOLUTION INTRAMUSCULAR; INTRAVENOUS at 11:01

## 2022-08-09 RX ADMIN — METHYLPREDNISOLONE SODIUM SUCCINATE 20 MG: 40 INJECTION, POWDER, FOR SOLUTION INTRAMUSCULAR; INTRAVENOUS at 17:39

## 2022-08-09 RX ADMIN — MESALAMINE 2.4 G: 1.2 TABLET, DELAYED RELEASE ORAL at 12:01

## 2022-08-09 RX ADMIN — MESALAMINE 2.4 G: 1.2 TABLET, DELAYED RELEASE ORAL at 19:56

## 2022-08-09 RX ADMIN — HYDROMORPHONE HYDROCHLORIDE 0.5 MG: 1 INJECTION, SOLUTION INTRAMUSCULAR; INTRAVENOUS; SUBCUTANEOUS at 21:43

## 2022-08-09 RX ADMIN — METRONIDAZOLE 500 MG: 500 INJECTION, SOLUTION INTRAVENOUS at 17:39

## 2022-08-09 RX ADMIN — Medication 3 ML: at 19:57

## 2022-08-09 RX ADMIN — FAMOTIDINE 40 MG: 20 TABLET ORAL at 08:44

## 2022-08-09 RX ADMIN — Medication 3 ML: at 08:45

## 2022-08-09 RX ADMIN — HYDROMORPHONE HYDROCHLORIDE 0.5 MG: 1 INJECTION, SOLUTION INTRAMUSCULAR; INTRAVENOUS; SUBCUTANEOUS at 12:01

## 2022-08-09 RX ADMIN — BUPROPION HYDROCHLORIDE 150 MG: 150 TABLET, EXTENDED RELEASE ORAL at 08:44

## 2022-08-09 RX ADMIN — PROMETHAZINE HYDROCHLORIDE 25 MG: 25 TABLET ORAL at 08:49

## 2022-08-09 NOTE — PLAN OF CARE
Goal Outcome Evaluation:              Outcome Evaluation: Patient pleasant and cooperative. GI seen this AM. Pain medication given and effective. IVF infusing. Started on Flagyl.

## 2022-08-09 NOTE — CONSULTS
"GI CONSULT  NOTE:    Referring Provider:  Dr. Greer    Chief complaint: Colitis    Subjective . \"I was having worsening pain and diarrhea\"    History of present illness: Sanam Tijerina is a 42 y.o. female who has a history of left-sided ulcerative colitis, diagnosed in 2009, C. difficile infection and anxiety.  She presents with a 2-week history of abdominal pain, diarrhea, fatigue, nausea and anorexia.  She tried managing at home with turmeric and diet changes without significant relief.  Previously managed on Lialda.  She was started on budesonide recently without significant change in symptoms.  Outpatient labs showed CRP 50, WBC 23, fecal calprotectin 908 and C. difficile/stool culture unremarkable.  She is having lower abdominal pain, weakness and fatigue along with diarrhea and fecal urgency.  She is having more than 10 bowel movements per day but denies melena or rectal bleeding.  Nausea but no vomiting.  No fevers or chills.      Endo History:  4/2019 colonoscopy by Dr. Ibarra -hemorrhoids, hyperplastic polyp, benign right and left colon biopsies    Past Medical History:  Past Medical History:   Diagnosis Date   • Ulcerative colitis (HCC)    C. difficile    Past Surgical History:  Past Surgical History:   Procedure Laterality Date   • HYSTERECTOMY     Colonoscopy    Social History:  Social History     Tobacco Use   • Smoking status: Never Smoker   • Smokeless tobacco: Never Used   Vaping Use   • Vaping Use: Never used   Substance Use Topics   • Alcohol use: Never   • Drug use: Never   None    Family History:  No family history of colon cancer    Medications:  Medications Prior to Admission   Medication Sig Dispense Refill Last Dose   • ALPRAZolam (XANAX) 0.25 MG tablet Take 0.25 mg by mouth At Night As Needed for Anxiety.      • Budesonide (ENTOCORT EC) 3 MG 24 hr capsule Take 9 mg by mouth Every Morning.      • buPROPion XL (WELLBUTRIN XL) 150 MG 24 hr tablet Take 150 mg by mouth Daily.      • dicyclomine " (BENTYL) 20 MG tablet Take 20 mg by mouth Every 6 (Six) Hours.      • mesalamine (ROWASA) 4 g enema Insert 1 g into the rectum Every Night. Suppository          Scheduled Meds:buPROPion XL, 150 mg, Oral, Daily  dicyclomine, 20 mg, Oral, 4x Daily  famotidine, 40 mg, Oral, Daily  mesalamine, 2.4 g, Oral, BID  methylPREDNISolone sodium succinate, 20 mg, Intravenous, Q8H  metroNIDAZOLE, 500 mg, Intravenous, Q8H  sodium chloride, 3 mL, Intravenous, Q12H      Continuous Infusions:sodium chloride, 100 mL/hr, Last Rate: 100 mL/hr (08/09/22 0845)      PRN Meds:.•  acetaminophen  •  ALPRAZolam  •  HYDROmorphone  •  potassium chloride  •  potassium chloride  •  promethazine  •  sodium chloride  •  sodium chloride    ALLERGIES:  Flagyl [metronidazole]    ROS:  The following systems were reviewed   Constitution:  No fevers, chills, no unintentional weight loss  Skin: no rash, no jaundice  Eyes:  No blurry vision, no eye pain  HENT:  No change in hearing or smell  Resp:  No dyspnea or cough  CV:  No chest pain or palpitations  :  No dysuria, hematuria  Musculoskeletal:  No leg cramps or arthralgias  Neuro:  No tremor, no numbness  Psych:  No depression or confusion    Objective Resting in bed, no acute distress, no family present    Vital Signs:   Vitals:    08/08/22 1941 08/09/22 0040 08/09/22 0337 08/09/22 0755   BP: 125/85 117/68 115/66 121/80   BP Location: Right arm Right arm Right arm Right arm   Patient Position: Lying Lying Lying Lying   Pulse: 95 87 73 97   Resp: 17 14 14 15   Temp: 98.5 °F (36.9 °C) 98.5 °F (36.9 °C) 98 °F (36.7 °C) 98.1 °F (36.7 °C)   TempSrc: Oral Oral Oral Oral   SpO2: 100% 98% 98% 98%   Weight: 63.4 kg (139 lb 12.4 oz)      Height:           Physical Exam:       General Appearance:    Awake and alert, in no acute distress   Head:    Normocephalic, without obvious abnormality, atraumatic   Throat:   No oral lesions, no thrush, oral mucosa moist   Lungs:     Respirations regular, even and unlabored    Chest Wall:    No abnormalities observed       Rectal:     Deferred   Extremities:   Moves all extremities, no edema, no cyanosis   Pulses:   Pulses palpable and equal bilaterally   Skin:   No rash, no jaundice, normal palpation       Neurologic:   Cranial nerves 2 - 12 grossly intact       Results Review:   I reviewed the patient's labs and imaging.  CBC    Results from last 7 days   Lab Units 08/08/22  2346 08/08/22  1244   WBC 10*3/mm3 22.40* 19.10*   HEMOGLOBIN g/dL 12.6 14.5   PLATELETS 10*3/mm3 456* 528*     CMP   Results from last 7 days   Lab Units 08/08/22  2346 08/08/22  1244   SODIUM mmol/L 136 136   POTASSIUM mmol/L 4.2 4.4   CHLORIDE mmol/L 99 98   CO2 mmol/L 27.0 27.0   BUN mg/dL 6 6   CREATININE mg/dL 0.73 0.77   GLUCOSE mg/dL 104* 112*   ALBUMIN g/dL  --  4.00   BILIRUBIN mg/dL  --  0.3   ALK PHOS U/L  --  91   AST (SGOT) U/L  --  13   ALT (SGPT) U/L  --  15   LIPASE U/L  --  14     Cr Clearance Estimated Creatinine Clearance: 90.9 mL/min (by C-G formula based on SCr of 0.73 mg/dL).  Coag     HbA1C No results found for: HGBA1C  Blood Glucose No results found for: POCGLU  Infection     UA    Results from last 7 days   Lab Units 08/08/22  1306   NITRITE UA  Negative   WBC UA /HPF 0-2*   BACTERIA UA /HPF None Seen   SQUAM EPITHEL UA /HPF 0-2     Radiology(recent) CT Abdomen Pelvis With Contrast    Result Date: 8/8/2022   1. FINDINGS consistent with diffuse infectious/inflammatory colitis of the mid to distal transverse colon through the rectum. Similar distribution of colon inflammation can be seen in cases of ulcerative colitis. Correlate clinically. No evidence of bowel obstruction. 2. 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    Electronically Signed By-Joanne Mustafa MD On:8/8/2022 3:37 PM This report was finalized on [id number] by  Joanne Mustafa MD.         ASSESSMENT:  Ulcerative colitis with flare  Leukocytosis -likely secondary to  above  Anxiety    PLAN:  Patient presents with a 2-week history of abdominal pain, diarrhea, nausea with anorexia and fatigue.  Outpatient stool studies show negative culture and C. Difficile.  Outpatient CRP 50 with fecal calprotectin 908 with likely ulcerative colitis flare.  No relief of symptoms with 5-day history of budesonide, although improvement of CRP.  Start mesalamine, IV Solu-Medrol, IV Flagyl and dicyclomine.  Discontinue Zosyn.  IV fluids for hydration and okay for diet as tolerated.  Monitor response and hopefully home soon.  Continue supportive care we will follow.    I discussed the patients findings and my recommendations with the patient.    We appreciate the referral    Electronically signed by MERISSA Cm, 08/09/22, 9:32 AM EDT.

## 2022-08-09 NOTE — H&P
Patient Care Team:  Virgil Burrell MD as PCP - General (Family Medicine)  Kristy Ibarra MD as Consulting Physician (Gastroenterology)    Chief complaint LL quadrant pain    Subjective     Patient is a 42 y.o. female who presents with past medical history of ulcerative colitis had been diagnosed over 20 years ago.  She has been in remission for at least 2 years.  She sees Dr. Ibarra with GI.  She presented to the ED 8/8/2022 with complaints of intense left lower quadrant pain.  She states that she has had slowing of diarrhea since she is arrived in the hospital with therapy.  She has had light pink blood.  She admitted for further evaluation and treatment to include steroids, antibiotics and GI consult..      Review of Systems   Constitutional: Positive for activity change, appetite change and fatigue.   HENT: Negative.    Respiratory: Negative.    Cardiovascular: Negative.    Gastrointestinal: Positive for diarrhea.   Genitourinary: Negative.    Musculoskeletal: Negative.    Neurological: Negative.    Psychiatric/Behavioral: Negative.           History  Past Medical History:   Diagnosis Date   • Ulcerative colitis (HCC)      Past Surgical History:   Procedure Laterality Date   • HYSTERECTOMY       History reviewed. No pertinent family history.  Social History     Tobacco Use   • Smoking status: Never Smoker   • Smokeless tobacco: Never Used   Vaping Use   • Vaping Use: Never used   Substance Use Topics   • Alcohol use: Never   • Drug use: Never     Medications Prior to Admission   Medication Sig Dispense Refill Last Dose   • ALPRAZolam (XANAX) 0.25 MG tablet Take 0.25 mg by mouth At Night As Needed for Anxiety.      • Budesonide (ENTOCORT EC) 3 MG 24 hr capsule Take 9 mg by mouth Every Morning.      • buPROPion XL (WELLBUTRIN XL) 150 MG 24 hr tablet Take 150 mg by mouth Daily.      • dicyclomine (BENTYL) 20 MG tablet Take 20 mg by mouth Every 6 (Six) Hours.      • mesalamine (ROWASA) 4 g enema Insert 1 g into  the rectum Every Night. Suppository        Allergies:  Flagyl [metronidazole]    Objective     Vital Signs  Temp:  [98 °F (36.7 °C)-99.4 °F (37.4 °C)] 98.1 °F (36.7 °C)  Heart Rate:  [] 97  Resp:  [14-17] 15  BP: (107-161)/() 121/80       Physical Exam  Vitals reviewed.   HENT:      Head: Normocephalic.      Right Ear: External ear normal.      Left Ear: External ear normal.      Nose: Nose normal.      Mouth/Throat:      Mouth: Mucous membranes are moist.   Eyes:      General:         Right eye: No discharge.         Left eye: No discharge.   Cardiovascular:      Rate and Rhythm: Normal rate and regular rhythm.      Pulses: Normal pulses.      Heart sounds: Normal heart sounds.   Pulmonary:      Effort: Pulmonary effort is normal.      Breath sounds: Normal breath sounds.   Abdominal:      General: Bowel sounds are normal.      Palpations: Abdomen is soft.   Musculoskeletal:         General: Normal range of motion.      Cervical back: Normal range of motion.   Skin:     General: Skin is warm and dry.   Neurological:      Mental Status: She is alert and oriented to person, place, and time.   Psychiatric:         Behavior: Behavior normal.          Results Review:     Imaging Results (Last 24 Hours)     Procedure Component Value Units Date/Time    CT Abdomen Pelvis With Contrast [[id number]] Collected: 08/08/22 1534     Updated: 08/08/22 1539    Narrative:      CT ABDOMEN PELVIS W CONTRAST-     Date of Exam: 8/8/2022 3:14 PM     Indication: LLQ pain radiating into L flank.     Comparison: None available.     Technique: Contiguous axial CT images were obtained from the lung bases  to the pubic symphysis following uneventful administration of 100 cc  Isovue-370 intravenous and oral contrast. Sagittal and coronal  reconstructions were performed.  Automated exposure control and  iterative reconstruction methods were used.     FINDINGS:     There is diffuse thickening and mucosal hyperenhancement in the mid  to  distal transverse colon, descending colon, sigmoid colon, and rectum.  There is relative sparing of the ascending colon and proximal transverse  colon. FINDINGS suggest infectious/inflammatory colitis. The appendix is  normal. There is no indication of bowel obstruction. No pneumatosis,  abscess or free air is seen. There is no indication of bowel  obstruction.     The liver, gallbladder, spleen, pancreas, adrenals, and right kidney are  normal. A 3 mm nonobstructing stone is seen within the left lower renal  pole. The abdominal aorta caliber is normal. There are no pathologically  enlarged lymph nodes. There is no ascites.     The lung bases are free of acute airspace disease. Heart size is within  normal limits.     Bilateral L5 pars defects with grade 1 anterolisthesis L5 upon S1. No  acute or suspicious osseous abnormalities.          Impression:         1. FINDINGS consistent with diffuse infectious/inflammatory colitis of  the mid to distal transverse colon through the rectum. Similar  distribution of colon inflammation can be seen in cases of ulcerative  colitis. Correlate clinically. No evidence of bowel obstruction.  2. Bilateral L5 spondylolytic defects with grade 1 anterolisthesis L5  upon S1.           Electronically Signed By-Joanne Mustafa MD On:8/8/2022 3:37 PM  This report was finalized on [id number] by  Joanne Mustafa MD.           Lab Results (last 24 hours)     Procedure Component Value Units Date/Time    Basic Metabolic Panel [[id number]]  (Abnormal) Collected: 08/08/22 2346    Specimen: Blood Updated: 08/09/22 0103     Glucose 104 mg/dL      BUN 6 mg/dL      Creatinine 0.73 mg/dL      Sodium 136 mmol/L      Potassium 4.2 mmol/L      Chloride 99 mmol/L      CO2 27.0 mmol/L      Calcium 8.5 mg/dL      BUN/Creatinine Ratio 8.2     Anion Gap 10.0 mmol/L      eGFR 106.1 mL/min/1.73      Comment: National Kidney Foundation and American Society of Nephrology (ASN) Task Force recommended  calculation based on the Chronic Kidney Disease Epidemiology Collaboration (CKD-EPI) equation refit without adjustment for race.       Narrative:      GFR Normal >60  Chronic Kidney Disease <60  Kidney Failure <15      CBC & Differential [[id number]]  (Abnormal) Collected: 08/08/22 2346    Specimen: Blood Updated: 08/09/22 0044    Narrative:      The following orders were created for panel order CBC & Differential.  Procedure                               Abnormality         Status                     ---------                               -----------         ------                     CBC Auto Differential[[id number]]        Abnormal            Final result                 Please view results for these tests on the individual orders.    CBC Auto Differential [[id number]]  (Abnormal) Collected: 08/08/22 2346    Specimen: Blood Updated: 08/09/22 0044     WBC 22.40 10*3/mm3      RBC 4.46 10*6/mm3      Hemoglobin 12.6 g/dL      Hematocrit 38.6 %      MCV 86.5 fL      MCH 28.3 pg      MCHC 32.7 g/dL      RDW 13.0 %      RDW-SD 39.8 fl      MPV 7.3 fL      Platelets 456 10*3/mm3      Neutrophil % 87.5 %      Lymphocyte % 9.1 %      Monocyte % 2.7 %      Eosinophil % 0.6 %      Basophil % 0.1 %      Neutrophils, Absolute 19.60 10*3/mm3      Lymphocytes, Absolute 2.00 10*3/mm3      Monocytes, Absolute 0.60 10*3/mm3      Eosinophils, Absolute 0.10 10*3/mm3      Basophils, Absolute 0.00 10*3/mm3      nRBC 0.2 /100 WBC     COVID PRE-OP / PRE-PROCEDURE SCREENING ORDER (NO ISOLATION) - Swab, Nasopharynx [[id number]]  (Normal) Collected: 08/08/22 1733    Specimen: Swab from Nasopharynx Updated: 08/08/22 5417    Narrative:      The following orders were created for panel order COVID PRE-OP / PRE-PROCEDURE SCREENING ORDER (NO ISOLATION) - Swab, Nasopharynx.  Procedure                               Abnormality         Status                     ---------                               -----------         ------                      COVID-19,CEPHEID/BALA,CO...[[id number]]  Normal              Final result                 Please view results for these tests on the individual orders.    COVID-19,CEPHEID/BALA,COR/PEG/PAD/OZIEL IN-HOUSE(OR EMERGENT/ADD-ON),NP SWAB IN TRANSPORT MEDIA 3-4 HR TAT, RT-PCR - Swab, Nasopharynx [[id number]]  (Normal) Collected: 08/08/22 1733    Specimen: Swab from Nasopharynx Updated: 08/08/22 1827     COVID19 Not Detected    Narrative:      Fact sheet for providers: https://www.fda.gov/media/639988/download     Fact sheet for patients: https://www.fda.gov/media/769864/download  Fact sheet for providers: https://www.fda.gov/media/129319/download     Fact sheet for patients: https://www.fda.gov/media/685977/download    Gastrointestinal Panel, PCR - Stool, Per Rectum [[id number]]  (Normal) Collected: 08/08/22 1258    Specimen: Stool from Per Rectum Updated: 08/08/22 1434     Campylobacter Not Detected     Plesiomonas shigelloides Not Detected     Salmonella Not Detected     Vibrio Not Detected     Vibrio cholerae Not Detected     Yersinia enterocolitica Not Detected     Enteroaggregative E. coli (EAEC) Not Detected     Enteropathogenic E. coli (EPEC) Not Detected     Enterotoxigenic E. coli (ETEC) lt/st Not Detected     Shiga-like toxin-producing E. coli (STEC) stx1/stx2 Not Detected     Shigella/Enteroinvasive E. coli (EIEC) Not Detected     Cryptosporidium Not Detected     Cyclospora cayetanensis Not Detected     Entamoeba histolytica Not Detected     Giardia lamblia Not Detected     Adenovirus F40/41 Not Detected     Astrovirus Not Detected     Norovirus GI/GII Not Detected     Rotavirus A Not Detected     Sapovirus (I, II, IV or V) Not Detected    Narrative:      If Aeromonas, Staphylococcus aureus or Bacillus cereus are suspected, please order [id number]: Stool Culture, Aeromonas, S aureus, B Cereus.    [facility] Draw [[id number]] Collected: 08/08/22 1244    Specimen: Blood Updated: 08/08/22 1348    Narrative:      The  following orders were created for panel order [place] Draw.  Procedure                               Abnormality         Status                     ---------                               -----------         ------                     Green Top (Gel)[[id number]]                                  Final result               Lavender Top[[id number]]                                     Final result               Gold Top - SST[[id number]]                                   Final result               Light Blue Top[[id number]]                                                                Please view results for these tests on the individual orders.    Lavender Top [[id number]] Collected: 08/08/22 1244    Specimen: Blood Updated: 08/08/22 1348     Extra Tube hold for add-on     Comment: Auto resulted       Gold Top - SST [[id number]] Collected: 08/08/22 1244    Specimen: Blood Updated: 08/08/22 1348     Extra Tube Hold for add-ons.     Comment: Auto resulted.       Fecal Lactoferrin Qual. - Stool, Per Rectum [[id number]]  (Normal) Collected: 08/08/22 1258    Specimen: Stool from Per Rectum Updated: 08/08/22 1324     Lactoferrin, Qual Negative    Urinalysis With Microscopic If Indicated (No Culture) - Urine, Clean Catch [[id number]]  (Abnormal) Collected: 08/08/22 1306    Specimen: Urine, Clean Catch Updated: 08/08/22 1320     Color, UA Yellow     Appearance, UA Clear     pH, UA 7.5     Specific Gravity, UA 1.010     Glucose, UA Negative     Ketones, UA 40 mg/dL (2+)     Bilirubin, UA Negative     Blood, UA Trace     Protein, UA Trace     Leuk Esterase, UA Negative     Nitrite, UA Negative     Urobilinogen, UA 0.2 E.U./dL    Urinalysis, Microscopic Only - Urine, Clean Catch [[id number]]  (Abnormal) Collected: 08/08/22 1306    Specimen: Urine, Clean Catch Updated: 08/08/22 1320     RBC, UA 6-12 /HPF      WBC, UA 0-2 /HPF      Bacteria, UA None Seen /HPF      Squamous Epithelial Cells, UA 0-2 /HPF      Hyaline Casts, UA None Seen /LPF       Methodology Automated Microscopy    Green Top (Gel) [[id number]] Collected: 08/08/22 1244    Specimen: Blood Updated: 08/08/22 1317     Extra Tube done    Comprehensive Metabolic Panel [[id number]]  (Abnormal) Collected: 08/08/22 1244    Specimen: Blood Updated: 08/08/22 1316     Glucose 112 mg/dL      BUN 6 mg/dL      Creatinine 0.77 mg/dL      Sodium 136 mmol/L      Potassium 4.4 mmol/L      Chloride 98 mmol/L      CO2 27.0 mmol/L      Calcium 9.5 mg/dL      Total Protein 8.0 g/dL      Albumin 4.00 g/dL      ALT (SGPT) 15 U/L      AST (SGOT) 13 U/L      Alkaline Phosphatase 91 U/L      Total Bilirubin 0.3 mg/dL      Globulin 4.0 gm/dL      A/G Ratio 1.0 g/dL      BUN/Creatinine Ratio 7.8     Anion Gap 11.0 mmol/L      eGFR 99.5 mL/min/1.73      Comment: National Kidney Foundation and American Society of Nephrology (ASN) Task Force recommended calculation based on the Chronic Kidney Disease Epidemiology Collaboration (CKD-EPI) equation refit without adjustment for race.       Narrative:      GFR Normal >60  Chronic Kidney Disease <60  Kidney Failure <15      C-reactive Protein [[id number]]  (Abnormal) Collected: 08/08/22 1244    Specimen: Blood Updated: 08/08/22 1316     C-Reactive Protein 13.40 mg/dL     Lipase [[id number]]  (Normal) Collected: 08/08/22 1244    Specimen: Blood Updated: 08/08/22 1316     Lipase 14 U/L     Occult Blood, Fecal By Immunoassay - Stool, Per Rectum [[id number]]  (Abnormal) Collected: 08/08/22 1258    Specimen: Stool from Per Rectum Updated: 08/08/22 1316     Occult Blood, Fecal by Immunoassay Positive    Blood Culture - Blood, Arm, Right [[id number]] Collected: 08/08/22 1255    Specimen: Blood from Arm, Right Updated: 08/08/22 1304    Sedimentation Rate [[id number]]  (Abnormal) Collected: 08/08/22 1244    Specimen: Blood Updated: 08/08/22 1303     Sed Rate 40 mm/hr     CBC & Differential [[id number]]  (Abnormal) Collected: 08/08/22 1244    Specimen: Blood Updated: 08/08/22 1251     Narrative:      The following orders were created for panel order CBC & Differential.  Procedure                               Abnormality         Status                     ---------                               -----------         ------                     CBC Auto Differential[[id number]]        Abnormal            Final result                 Please view results for these tests on the individual orders.    CBC Auto Differential [[id number]]  (Abnormal) Collected: 08/08/22 1244    Specimen: Blood Updated: 08/08/22 1251     WBC 19.10 10*3/mm3      RBC 5.03 10*6/mm3      Hemoglobin 14.5 g/dL      Hematocrit 43.0 %      MCV 85.5 fL      MCH 28.7 pg      MCHC 33.6 g/dL      RDW 12.9 %      RDW-SD 39.4 fl      MPV 6.9 fL      Platelets 528 10*3/mm3      Neutrophil % 84.8 %      Lymphocyte % 9.6 %      Monocyte % 5.1 %      Eosinophil % 0.2 %      Basophil % 0.3 %      Neutrophils, Absolute 16.20 10*3/mm3      Lymphocytes, Absolute 1.80 10*3/mm3      Monocytes, Absolute 1.00 10*3/mm3      Eosinophils, Absolute 0.00 10*3/mm3      Basophils, Absolute 0.10 10*3/mm3      nRBC 0.0 /100 WBC     Blood Culture - Blood, Arm, Left [[id number]] Collected: 08/08/22 1244    Specimen: Blood from Arm, Left Updated: 08/08/22 1249    POC Lactate [[id number]]  (Normal) Collected: 08/08/22 1248    Specimen: Blood Updated: 08/08/22 1249     Lactate 0.5 mmol/L      Comment: Serial Number: [id number]:  [id number]              I reviewed the patient's new clinical results.    Assessment & Plan       LLQ abdominal pain  Ulcerative colitis  Leukocytosis  -antibiotic zosyn  -steroids  -GI following     Mood disorder-wellbutrin    Diet:regular  DVT prophylaxis:scd  GI prophylaxis:pepcid  Code status:full      I discussed the patient's findings and my recommendations with patient.     MERISSA Grimm  08/09/22  08:46 EDT

## 2022-08-09 NOTE — PLAN OF CARE
Goal Outcome Evaluation:  Plan of Care Reviewed With: patient        Progress: no change  Outcome Evaluation: Slept at intervals. IV fluids and abx infusing. GI consulted to see today. PRN IV pain med given for abd pain. Will continue to monitor.

## 2022-08-10 ENCOUNTER — INPATIENT HOSPITAL (AMBULATORY)
Dept: URBAN - METROPOLITAN AREA HOSPITAL 84 | Facility: HOSPITAL | Age: 42
End: 2022-08-10

## 2022-08-10 DIAGNOSIS — R10.9 UNSPECIFIED ABDOMINAL PAIN: ICD-10-CM

## 2022-08-10 DIAGNOSIS — K62.5 HEMORRHAGE OF ANUS AND RECTUM: ICD-10-CM

## 2022-08-10 DIAGNOSIS — K51.90 ULCERATIVE COLITIS, UNSPECIFIED, WITHOUT COMPLICATIONS: ICD-10-CM

## 2022-08-10 DIAGNOSIS — R19.7 DIARRHEA, UNSPECIFIED: ICD-10-CM

## 2022-08-10 DIAGNOSIS — D72.829 ELEVATED WHITE BLOOD CELL COUNT, UNSPECIFIED: ICD-10-CM

## 2022-08-10 LAB
ALBUMIN SERPL-MCNC: 3 G/DL (ref 3.5–5.2)
ALBUMIN/GLOB SERPL: 1 G/DL
ALP SERPL-CCNC: 77 U/L (ref 39–117)
ALT SERPL W P-5'-P-CCNC: 9 U/L (ref 1–33)
ANION GAP SERPL CALCULATED.3IONS-SCNC: 8 MMOL/L (ref 5–15)
AST SERPL-CCNC: 6 U/L (ref 1–32)
BASOPHILS # BLD AUTO: 0 10*3/MM3 (ref 0–0.2)
BASOPHILS NFR BLD AUTO: 0.2 % (ref 0–1.5)
BILIRUB SERPL-MCNC: <0.2 MG/DL (ref 0–1.2)
BUN SERPL-MCNC: 7 MG/DL (ref 6–20)
BUN/CREAT SERPL: 11.9 (ref 7–25)
CALCIUM SPEC-SCNC: 8.3 MG/DL (ref 8.6–10.5)
CHLORIDE SERPL-SCNC: 105 MMOL/L (ref 98–107)
CO2 SERPL-SCNC: 27 MMOL/L (ref 22–29)
CREAT SERPL-MCNC: 0.59 MG/DL (ref 0.57–1)
DEPRECATED RDW RBC AUTO: 39.4 FL (ref 37–54)
EGFRCR SERPLBLD CKD-EPI 2021: 116.3 ML/MIN/1.73
EOSINOPHIL # BLD AUTO: 0 10*3/MM3 (ref 0–0.4)
EOSINOPHIL NFR BLD AUTO: 0 % (ref 0.3–6.2)
ERYTHROCYTE [DISTWIDTH] IN BLOOD BY AUTOMATED COUNT: 12.8 % (ref 12.3–15.4)
GLOBULIN UR ELPH-MCNC: 2.9 GM/DL
GLUCOSE SERPL-MCNC: 174 MG/DL (ref 65–99)
HCT VFR BLD AUTO: 33.5 % (ref 34–46.6)
HGB BLD-MCNC: 10.7 G/DL (ref 12–15.9)
LYMPHOCYTES # BLD AUTO: 1.8 10*3/MM3 (ref 0.7–3.1)
LYMPHOCYTES NFR BLD AUTO: 10.1 % (ref 19.6–45.3)
MCH RBC QN AUTO: 27.8 PG (ref 26.6–33)
MCHC RBC AUTO-ENTMCNC: 31.8 G/DL (ref 31.5–35.7)
MCV RBC AUTO: 87.2 FL (ref 79–97)
MONOCYTES # BLD AUTO: 1.1 10*3/MM3 (ref 0.1–0.9)
MONOCYTES NFR BLD AUTO: 6.5 % (ref 5–12)
NEUTROPHILS NFR BLD AUTO: 14.6 10*3/MM3 (ref 1.7–7)
NEUTROPHILS NFR BLD AUTO: 83.2 % (ref 42.7–76)
NRBC BLD AUTO-RTO: 0 /100 WBC (ref 0–0.2)
PLATELET # BLD AUTO: 472 10*3/MM3 (ref 140–450)
PMV BLD AUTO: 7.3 FL (ref 6–12)
POTASSIUM SERPL-SCNC: 4 MMOL/L (ref 3.5–5.2)
PROT SERPL-MCNC: 5.9 G/DL (ref 6–8.5)
RBC # BLD AUTO: 3.84 10*6/MM3 (ref 3.77–5.28)
SODIUM SERPL-SCNC: 140 MMOL/L (ref 136–145)
WBC NRBC COR # BLD: 17.6 10*3/MM3 (ref 3.4–10.8)

## 2022-08-10 PROCEDURE — 85025 COMPLETE CBC W/AUTO DIFF WBC: CPT | Performed by: INTERNAL MEDICINE

## 2022-08-10 PROCEDURE — G0378 HOSPITAL OBSERVATION PER HR: HCPCS

## 2022-08-10 PROCEDURE — 25010000002 HYDROMORPHONE 1 MG/ML SOLUTION: Performed by: INTERNAL MEDICINE

## 2022-08-10 PROCEDURE — 80053 COMPREHEN METABOLIC PANEL: CPT | Performed by: NURSE PRACTITIONER

## 2022-08-10 PROCEDURE — 25010000002 METHYLPREDNISOLONE PER 40 MG: Performed by: NURSE PRACTITIONER

## 2022-08-10 PROCEDURE — 99232 SBSQ HOSP IP/OBS MODERATE 35: CPT | Performed by: NURSE PRACTITIONER

## 2022-08-10 PROCEDURE — 63710000001 PROMETHAZINE PER 25 MG: Performed by: INTERNAL MEDICINE

## 2022-08-10 RX ORDER — LIDOCAINE 50 MG/G
1 PATCH TOPICAL
Status: DISCONTINUED | OUTPATIENT
Start: 2022-08-10 | End: 2022-08-12 | Stop reason: HOSPADM

## 2022-08-10 RX ADMIN — HYDROMORPHONE HYDROCHLORIDE 0.5 MG: 1 INJECTION, SOLUTION INTRAMUSCULAR; INTRAVENOUS; SUBCUTANEOUS at 02:23

## 2022-08-10 RX ADMIN — ALPRAZOLAM 0.25 MG: 0.25 TABLET ORAL at 23:21

## 2022-08-10 RX ADMIN — HYDROMORPHONE HYDROCHLORIDE 0.5 MG: 1 INJECTION, SOLUTION INTRAMUSCULAR; INTRAVENOUS; SUBCUTANEOUS at 14:45

## 2022-08-10 RX ADMIN — Medication 3 ML: at 20:36

## 2022-08-10 RX ADMIN — DICYCLOMINE HYDROCHLORIDE 20 MG: 10 CAPSULE ORAL at 07:39

## 2022-08-10 RX ADMIN — METRONIDAZOLE 500 MG: 500 INJECTION, SOLUTION INTRAVENOUS at 17:47

## 2022-08-10 RX ADMIN — DICYCLOMINE HYDROCHLORIDE 20 MG: 10 CAPSULE ORAL at 17:47

## 2022-08-10 RX ADMIN — HYDROMORPHONE HYDROCHLORIDE 0.5 MG: 1 INJECTION, SOLUTION INTRAMUSCULAR; INTRAVENOUS; SUBCUTANEOUS at 12:05

## 2022-08-10 RX ADMIN — PROMETHAZINE HYDROCHLORIDE 25 MG: 25 TABLET ORAL at 20:44

## 2022-08-10 RX ADMIN — FAMOTIDINE 40 MG: 20 TABLET ORAL at 07:39

## 2022-08-10 RX ADMIN — HYDROMORPHONE HYDROCHLORIDE 0.5 MG: 1 INJECTION, SOLUTION INTRAMUSCULAR; INTRAVENOUS; SUBCUTANEOUS at 07:39

## 2022-08-10 RX ADMIN — PROMETHAZINE HYDROCHLORIDE 25 MG: 25 TABLET ORAL at 07:53

## 2022-08-10 RX ADMIN — MESALAMINE 2.4 G: 1.2 TABLET, DELAYED RELEASE ORAL at 07:39

## 2022-08-10 RX ADMIN — BUPROPION HYDROCHLORIDE 150 MG: 150 TABLET, EXTENDED RELEASE ORAL at 07:39

## 2022-08-10 RX ADMIN — DICYCLOMINE HYDROCHLORIDE 20 MG: 10 CAPSULE ORAL at 12:05

## 2022-08-10 RX ADMIN — DICYCLOMINE HYDROCHLORIDE 20 MG: 10 CAPSULE ORAL at 20:37

## 2022-08-10 RX ADMIN — HYDROMORPHONE HYDROCHLORIDE 0.5 MG: 1 INJECTION, SOLUTION INTRAMUSCULAR; INTRAVENOUS; SUBCUTANEOUS at 20:37

## 2022-08-10 RX ADMIN — Medication 3 ML: at 07:39

## 2022-08-10 RX ADMIN — METHYLPREDNISOLONE SODIUM SUCCINATE 20 MG: 40 INJECTION, POWDER, FOR SOLUTION INTRAMUSCULAR; INTRAVENOUS at 08:27

## 2022-08-10 RX ADMIN — SODIUM CHLORIDE 100 ML/HR: 9 INJECTION, SOLUTION INTRAVENOUS at 00:04

## 2022-08-10 RX ADMIN — METHYLPREDNISOLONE SODIUM SUCCINATE 20 MG: 40 INJECTION, POWDER, FOR SOLUTION INTRAMUSCULAR; INTRAVENOUS at 02:23

## 2022-08-10 RX ADMIN — SODIUM CHLORIDE 100 ML/HR: 9 INJECTION, SOLUTION INTRAVENOUS at 17:47

## 2022-08-10 RX ADMIN — METRONIDAZOLE 500 MG: 500 INJECTION, SOLUTION INTRAVENOUS at 08:27

## 2022-08-10 RX ADMIN — METHYLPREDNISOLONE SODIUM SUCCINATE 20 MG: 40 INJECTION, POWDER, FOR SOLUTION INTRAMUSCULAR; INTRAVENOUS at 17:47

## 2022-08-10 RX ADMIN — HYDROMORPHONE HYDROCHLORIDE 0.5 MG: 1 INJECTION, SOLUTION INTRAMUSCULAR; INTRAVENOUS; SUBCUTANEOUS at 17:47

## 2022-08-10 RX ADMIN — HYDROMORPHONE HYDROCHLORIDE 0.5 MG: 1 INJECTION, SOLUTION INTRAMUSCULAR; INTRAVENOUS; SUBCUTANEOUS at 23:20

## 2022-08-10 RX ADMIN — MESALAMINE 2.4 G: 1.2 TABLET, DELAYED RELEASE ORAL at 20:37

## 2022-08-10 RX ADMIN — LIDOCAINE 1 PATCH: 50 PATCH CUTANEOUS at 12:05

## 2022-08-10 RX ADMIN — METRONIDAZOLE 500 MG: 500 INJECTION, SOLUTION INTRAVENOUS at 02:23

## 2022-08-10 NOTE — PROGRESS NOTES
LOS: 0 days   Patient Care Team:  Virgil Burrell MD as PCP - General (Family Medicine)  Kristy Ibarra MD as Consulting Physician (Gastroenterology)    Subjective     Interval History:    Patient Complaints:  Diarrhea improving .WBC down to 17 from 22    History taken from: patient    Review of Systems   Constitutional: Positive for activity change and appetite change. Negative for fever.   HENT: Negative for trouble swallowing.    Eyes: Negative for visual disturbance.   Respiratory: Negative for cough.    Cardiovascular: Negative for chest pain and palpitations.   Gastrointestinal: Positive for abdominal pain, diarrhea and nausea. Negative for blood in stool (no blod in stool today ) and vomiting.   Genitourinary: Negative for difficulty urinating.   Musculoskeletal: Positive for back pain (low back pain ).   Skin: Negative for pallor, rash and wound.   Neurological: Negative for headaches.   Hematological: Does not bruise/bleed easily.   Psychiatric/Behavioral: Negative for agitation and confusion.           Objective     Vital Signs  Temp:  [98.1 °F (36.7 °C)-98.8 °F (37.1 °C)] 98.8 °F (37.1 °C)  Heart Rate:  [] 94  Resp:  [15-24] 16  BP: (115-140)/(60-75) 130/65    Physical Exam:     General Appearance:    Alert, cooperative, in no acute distress,   Head:    Normocephalic, without obvious abnormality, atraumatic   Eyes:            Lids and lashes normal, conjunctivae and sclerae normal, no   icterus, no pallor, corneas clear, PERRLA   Ears:    Ears appear intact with no abnormalities noted   Throat:   No oral lesions, no thrush, oral mucosa moist   Neck:   No adenopathy, supple, trachea midline, no thyromegaly, no   carotid bruit, no JVD   Lungs:     Clear to auscultation,respirations regular, even and                  unlabored    Heart:    Regular rhythm and normal rate, normal S1 and S2, no            murmur, no gallop, no rub, no click   Chest Wall:    No abnormalities observed   Abdomen:     Normal  bowel sounds, no masses, no organomegaly, soft        Diffusely tender with palpation.  non-distended, no guarding,   Extremities:   Moves all extremities well, no edema, no cyanosis, no             Redness   Pulses:   Pulses palpable and equal bilaterally   Skin:   No bleeding, bruising or rash   Lymph nodes:   No palpable adenopathy   Neurologic:   Cranial nerves 2 - 12 grossly intact, sensation intact, DTR       present and equal bilaterally        Results Review:    Lab Results (last 24 hours)     Procedure Component Value Units Date/Time    Comprehensive Metabolic Panel [[id number]]  (Abnormal) Collected: 08/10/22 0238    Specimen: Blood Updated: 08/10/22 0356     Glucose 174 mg/dL      BUN 7 mg/dL      Creatinine 0.59 mg/dL      Sodium 140 mmol/L      Potassium 4.0 mmol/L      Chloride 105 mmol/L      CO2 27.0 mmol/L      Calcium 8.3 mg/dL      Total Protein 5.9 g/dL      Albumin 3.00 g/dL      ALT (SGPT) 9 U/L      AST (SGOT) 6 U/L      Alkaline Phosphatase 77 U/L      Total Bilirubin <0.2 mg/dL      Globulin 2.9 gm/dL      A/G Ratio 1.0 g/dL      BUN/Creatinine Ratio 11.9     Anion Gap 8.0 mmol/L      eGFR 116.3 mL/min/1.73      Comment: National Kidney Foundation and American Society of Nephrology (ASN) Task Force recommended calculation based on the Chronic Kidney Disease Epidemiology Collaboration (CKD-EPI) equation refit without adjustment for race.       Narrative:      GFR Normal >60  Chronic Kidney Disease <60  Kidney Failure <15      CBC & Differential [[id number]]  (Abnormal) Collected: 08/10/22 0238    Specimen: Blood Updated: 08/10/22 0313    Narrative:      The following orders were created for panel order CBC & Differential.  Procedure                               Abnormality         Status                     ---------                               -----------         ------                     CBC Auto Differential[[id number]]        Abnormal            Final result                 Please view  results for these tests on the individual orders.    CBC Auto Differential [[id number]]  (Abnormal) Collected: 08/10/22 0238    Specimen: Blood Updated: 08/10/22 0313     WBC 17.60 10*3/mm3      RBC 3.84 10*6/mm3      Hemoglobin 10.7 g/dL      Hematocrit 33.5 %      MCV 87.2 fL      MCH 27.8 pg      MCHC 31.8 g/dL      RDW 12.8 %      RDW-SD 39.4 fl      MPV 7.3 fL      Platelets 472 10*3/mm3      Neutrophil % 83.2 %      Lymphocyte % 10.1 %      Monocyte % 6.5 %      Eosinophil % 0.0 %      Basophil % 0.2 %      Neutrophils, Absolute 14.60 10*3/mm3      Lymphocytes, Absolute 1.80 10*3/mm3      Monocytes, Absolute 1.10 10*3/mm3      Eosinophils, Absolute 0.00 10*3/mm3      Basophils, Absolute 0.00 10*3/mm3      nRBC 0.0 /100 WBC     Blood Culture - Blood, Arm, Right [[id number]]  (Normal) Collected: 08/08/22 1255    Specimen: Blood from Arm, Right Updated: 08/09/22 1318     Blood Culture No growth at 24 hours    Blood Culture - Blood, Arm, Left [[id number]]  (Normal) Collected: 08/08/22 1244    Specimen: Blood from Arm, Left Updated: 08/09/22 1304     Blood Culture No growth at 24 hours           Imaging Results (Last 24 Hours)     ** No results found for the last 24 hours. **               I reviewed the patient's new clinical results.    Medication Review:   Scheduled Meds:buPROPion XL, 150 mg, Oral, Daily  dicyclomine, 20 mg, Oral, 4x Daily  famotidine, 40 mg, Oral, Daily  lidocaine, 1 patch, Transdermal, Q24H  mesalamine, 2.4 g, Oral, BID  methylPREDNISolone sodium succinate, 20 mg, Intravenous, Q8H  metroNIDAZOLE, 500 mg, Intravenous, Q8H  sodium chloride, 3 mL, Intravenous, Q12H      Continuous Infusions:sodium chloride, 100 mL/hr, Last Rate: 100 mL/hr (08/10/22 0738)      PRN Meds:.•  acetaminophen  •  ALPRAZolam  •  HYDROmorphone  •  potassium chloride  •  potassium chloride  •  promethazine  •  sodium chloride  •  sodium chloride     Assessment & Plan       LLQ abdominal pain    Ulcerative Colitis- zosyn, IV  steroids, GI following. Diarrhea improved yet continues.     Mood D/O- continue wellbutrin .     Leukocytosis- improved today     Low back pain - CT abd with incidental finding of L5 spondylolytic defect with grade 1 anterolisthesis. Lidoderm patch . May need further outpt work up.     Plan for disposition: home possible tomorrow if diarrhea improved     MERISSA Banuelos  08/10/22  12:23 EDT

## 2022-08-10 NOTE — PLAN OF CARE
Goal Outcome Evaluation:  Plan of Care Reviewed With: patient           Outcome Evaluation: Pt resting abed AOx4 on RA. IVF running per ordrs. PRN pain medication given and effective per pt

## 2022-08-10 NOTE — PLAN OF CARE
Goal Outcome Evaluation:              Outcome Evaluation: Patient pleasant and cooperative. PRN pain medication given and effective. IVF infusing.

## 2022-08-10 NOTE — PROGRESS NOTES
" LOS: 0 days   Patient Care Team:  Virgil Burrell MD as PCP - General (Family Medicine)  Kristy Ibarra MD as Consulting Physician (Gastroenterology)      Subjective \"I am ok\"    Interval History:     Subjective: No nausea or vomiting and appetite improved.  Tolerating diet without difficulty.  Intermittent abdominal cramping with diarrhea.  10 bowel movements yesterday with liquid stool this morning.  Less rectal bleeding.  No fevers reported.      ROS:   No chest pain, shortness of breath, or cough.        Medication Review:     Current Facility-Administered Medications:   •  acetaminophen (TYLENOL) tablet 650 mg, 650 mg, Oral, Q4H PRN, Anamika Greer MD  •  ALPRAZolam (XANAX) tablet 0.25 mg, 0.25 mg, Oral, Nightly PRN, Terri Wiseman APRN  •  buPROPion XL (WELLBUTRIN XL) 24 hr tablet 150 mg, 150 mg, Oral, Daily, Terri Wiseman APRN, 150 mg at 08/10/22 0739  •  dicyclomine (BENTYL) capsule 20 mg, 20 mg, Oral, 4x Daily, Terri Wiseman APRN, 20 mg at 08/10/22 0739  •  famotidine (PEPCID) tablet 40 mg, 40 mg, Oral, Daily, Anamika Greer MD, 40 mg at 08/10/22 0739  •  HYDROmorphone (DILAUDID) injection 0.5 mg, 0.5 mg, Intravenous, Q2H PRN, Anamika Greer MD, 0.5 mg at 08/10/22 0739  •  mesalamine (LIALDA) EC tablet 2.4 g, 2.4 g, Oral, BID, Eri Stockton APRN, 2.4 g at 08/10/22 0739  •  methylPREDNISolone sodium succinate (SOLU-Medrol) injection 20 mg, 20 mg, Intravenous, Q8H, Eri Stockton APRN, 20 mg at 08/10/22 0827  •  metroNIDAZOLE (FLAGYL) IVPB 500 mg, 500 mg, Intravenous, Q8H, Eri Stockton APRN, Last Rate: 0 mL/hr at 08/10/22 0738, 500 mg at 08/10/22 0827  •  potassium chloride (K-DUR,KLOR-CON) CR tablet 40 mEq, 40 mEq, Oral, PRN, Anamika Greer MD  •  potassium chloride (KLOR-CON) packet 40 mEq, 40 mEq, Oral, PRN, Anamika Greer MD  •  promethazine (PHENERGAN) tablet 25 mg, 25 mg, Oral, Q6H PRN, Anamika Greer MD, 25 mg at 08/10/22 0753  •  sodium chloride 0.9 % flush 10 mL, 10 mL, Intravenous, PRN, Percy, " MD Anamika  •  sodium chloride 0.9 % flush 3 mL, 3 mL, Intravenous, Q12H, Anamika Greer MD, 3 mL at 08/10/22 0739  •  sodium chloride 0.9 % flush 3-10 mL, 3-10 mL, Intravenous, PRN, Anamika Greer MD  •  sodium chloride 0.9 % infusion, 100 mL/hr, Intravenous, Continuous, Anamika Greer MD, Last Rate: 100 mL/hr at 08/10/22 0738, 100 mL/hr at 08/10/22 0738      Objective Resting in bed, no acute distress, no family present    Vital Signs  Vitals:    08/09/22 2325 08/10/22 0300 08/10/22 0600 08/10/22 0659   BP: 115/70 115/60  126/71   BP Location: Right arm Right arm  Right arm   Patient Position: Lying Lying  Lying   Pulse: 96 92 68 82   Resp: 18 24  15   Temp: 98.6 °F (37 °C) 98.2 °F (36.8 °C)  98.4 °F (36.9 °C)   TempSrc: Oral Oral  Oral   SpO2: 96% 96% 96% 98%   Weight:       Height:           Physical Exam:     General Appearance:    Awake and alert, in no acute distress   Head:    Normocephalic, without obvious abnormality   Eyes:          Conjunctivae normal, anicteric sclera   Throat:   No oral lesions, no thrush, oral mucosa moist   Neck:   , supple, no JVD   Lungs:     respirations regular, even and unlabored       Rectal:     Deferred   Extremities:   No edema, no cyanosis   Skin:   No bruising or rash, no jaundice        Results Review:    CBC    Results from last 7 days   Lab Units 08/10/22  0238 08/08/22  2346 08/08/22  1244   WBC 10*3/mm3 17.60* 22.40* 19.10*   HEMOGLOBIN g/dL 10.7* 12.6 14.5   PLATELETS 10*3/mm3 472* 456* 528*     CMP   Results from last 7 days   Lab Units 08/10/22  0238 08/08/22  2346 08/08/22  1244   SODIUM mmol/L 140 136 136   POTASSIUM mmol/L 4.0 4.2 4.4   CHLORIDE mmol/L 105 99 98   CO2 mmol/L 27.0 27.0 27.0   BUN mg/dL 7 6 6   CREATININE mg/dL 0.59 0.73 0.77   GLUCOSE mg/dL 174* 104* 112*   ALBUMIN g/dL 3.00*  --  4.00   BILIRUBIN mg/dL <0.2  --  0.3   ALK PHOS U/L 77  --  91   AST (SGOT) U/L 6  --  13   ALT (SGPT) U/L 9  --  15   LIPASE U/L  --   --  14     Cr Clearance Estimated  Creatinine Clearance: 112.5 mL/min (by C-G formula based on SCr of 0.59 mg/dL).  Coag     HbA1C No results found for: HGBA1C  Blood Glucose No results found for: POCGLU  Infection   Results from last 7 days   Lab Units 08/08/22  1255 08/08/22  1244   BLOODCX  No growth at 24 hours No growth at 24 hours     UA    Results from last 7 days   Lab Units 08/08/22  1306   NITRITE UA  Negative   WBC UA /HPF 0-2*   BACTERIA UA /HPF None Seen   SQUAM EPITHEL UA /HPF 0-2     Radiology(recent) CT Abdomen Pelvis With Contrast    Result Date: 8/8/2022   1. FINDINGS consistent with diffuse infectious/inflammatory colitis of the mid to distal transverse colon through the rectum. Similar distribution of colon inflammation can be seen in cases of ulcerative colitis. Correlate clinically. No evidence of bowel obstruction. 2. 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    Electronically Signed By-Joanne Mustafa MD On:8/8/2022 3:37 PM This report was finalized on [id number] by  Joanne Mustafa MD.         Assessment & Plan   Ulcerative colitis with flare  Leukocytosis -likely secondary to above-improving  Anxiety     PLAN:  Stool studies negative for infection as outpatient with improvement of CRP from 50 to 13.  Improvement overnight on IV steroids and mesalamine.  Continue IV Flagyl while inpatient along with Bentyl for abdominal cramping.  Okay for diet as tolerated.  We will keep at least another day until diarrhea improves/manageable.  Continue supportive care and we will follow.    Electronically signed by MERISSA Cm, 08/10/22, 8:42 AM EDT.

## 2022-08-11 ENCOUNTER — INPATIENT HOSPITAL (AMBULATORY)
Dept: URBAN - METROPOLITAN AREA HOSPITAL 84 | Facility: HOSPITAL | Age: 42
End: 2022-08-11

## 2022-08-11 DIAGNOSIS — D72.829 ELEVATED WHITE BLOOD CELL COUNT, UNSPECIFIED: ICD-10-CM

## 2022-08-11 DIAGNOSIS — R19.7 DIARRHEA, UNSPECIFIED: ICD-10-CM

## 2022-08-11 DIAGNOSIS — K51.911 ULCERATIVE COLITIS, UNSPECIFIED WITH RECTAL BLEEDING: ICD-10-CM

## 2022-08-11 LAB
ALBUMIN SERPL-MCNC: 3.3 G/DL (ref 3.5–5.2)
ALBUMIN/GLOB SERPL: 1.2 G/DL
ALP SERPL-CCNC: 59 U/L (ref 39–117)
ALT SERPL W P-5'-P-CCNC: 10 U/L (ref 1–33)
ANION GAP SERPL CALCULATED.3IONS-SCNC: 8 MMOL/L (ref 5–15)
AST SERPL-CCNC: 9 U/L (ref 1–32)
BASOPHILS # BLD AUTO: 0 10*3/MM3 (ref 0–0.2)
BASOPHILS NFR BLD AUTO: 0.1 % (ref 0–1.5)
BILIRUB SERPL-MCNC: <0.2 MG/DL (ref 0–1.2)
BUN SERPL-MCNC: 8 MG/DL (ref 6–20)
BUN/CREAT SERPL: 12.5 (ref 7–25)
CALCIUM SPEC-SCNC: 8.5 MG/DL (ref 8.6–10.5)
CHLORIDE SERPL-SCNC: 104 MMOL/L (ref 98–107)
CO2 SERPL-SCNC: 26 MMOL/L (ref 22–29)
CREAT SERPL-MCNC: 0.64 MG/DL (ref 0.57–1)
DEPRECATED RDW RBC AUTO: 38.9 FL (ref 37–54)
EGFRCR SERPLBLD CKD-EPI 2021: 114 ML/MIN/1.73
EOSINOPHIL # BLD AUTO: 0 10*3/MM3 (ref 0–0.4)
EOSINOPHIL NFR BLD AUTO: 0 % (ref 0.3–6.2)
ERYTHROCYTE [DISTWIDTH] IN BLOOD BY AUTOMATED COUNT: 12.8 % (ref 12.3–15.4)
GLOBULIN UR ELPH-MCNC: 2.7 GM/DL
GLUCOSE SERPL-MCNC: 169 MG/DL (ref 65–99)
HCT VFR BLD AUTO: 34.7 % (ref 34–46.6)
HGB BLD-MCNC: 11 G/DL (ref 12–15.9)
LYMPHOCYTES # BLD AUTO: 1.9 10*3/MM3 (ref 0.7–3.1)
LYMPHOCYTES NFR BLD AUTO: 8.3 % (ref 19.6–45.3)
MCH RBC QN AUTO: 27.9 PG (ref 26.6–33)
MCHC RBC AUTO-ENTMCNC: 31.8 G/DL (ref 31.5–35.7)
MCV RBC AUTO: 87.7 FL (ref 79–97)
MONOCYTES # BLD AUTO: 1.3 10*3/MM3 (ref 0.1–0.9)
MONOCYTES NFR BLD AUTO: 5.6 % (ref 5–12)
NEUTROPHILS NFR BLD AUTO: 19.5 10*3/MM3 (ref 1.7–7)
NEUTROPHILS NFR BLD AUTO: 86 % (ref 42.7–76)
NRBC BLD AUTO-RTO: 0 /100 WBC (ref 0–0.2)
PLATELET # BLD AUTO: 474 10*3/MM3 (ref 140–450)
PMV BLD AUTO: 7.4 FL (ref 6–12)
POTASSIUM SERPL-SCNC: 4.5 MMOL/L (ref 3.5–5.2)
PROT SERPL-MCNC: 6 G/DL (ref 6–8.5)
RBC # BLD AUTO: 3.96 10*6/MM3 (ref 3.77–5.28)
SODIUM SERPL-SCNC: 138 MMOL/L (ref 136–145)
WBC NRBC COR # BLD: 22.7 10*3/MM3 (ref 3.4–10.8)

## 2022-08-11 PROCEDURE — 25010000002 METHYLPREDNISOLONE PER 40 MG: Performed by: NURSE PRACTITIONER

## 2022-08-11 PROCEDURE — 63710000001 PROMETHAZINE PER 25 MG: Performed by: INTERNAL MEDICINE

## 2022-08-11 PROCEDURE — 80053 COMPREHEN METABOLIC PANEL: CPT | Performed by: NURSE PRACTITIONER

## 2022-08-11 PROCEDURE — 99232 SBSQ HOSP IP/OBS MODERATE 35: CPT | Performed by: NURSE PRACTITIONER

## 2022-08-11 PROCEDURE — 25010000002 HYDROMORPHONE 1 MG/ML SOLUTION: Performed by: INTERNAL MEDICINE

## 2022-08-11 PROCEDURE — 85025 COMPLETE CBC W/AUTO DIFF WBC: CPT | Performed by: INTERNAL MEDICINE

## 2022-08-11 RX ORDER — TRAMADOL HYDROCHLORIDE 50 MG/1
50 TABLET ORAL EVERY 6 HOURS PRN
Status: DISCONTINUED | OUTPATIENT
Start: 2022-08-11 | End: 2022-08-12 | Stop reason: HOSPADM

## 2022-08-11 RX ORDER — MESALAMINE 1000 MG/1
1000 SUPPOSITORY RECTAL NIGHTLY
COMMUNITY
End: 2022-08-12 | Stop reason: HOSPADM

## 2022-08-11 RX ADMIN — SODIUM CHLORIDE 100 ML/HR: 9 INJECTION, SOLUTION INTRAVENOUS at 04:44

## 2022-08-11 RX ADMIN — MESALAMINE 2.4 G: 1.2 TABLET, DELAYED RELEASE ORAL at 20:59

## 2022-08-11 RX ADMIN — DICYCLOMINE HYDROCHLORIDE 20 MG: 10 CAPSULE ORAL at 11:31

## 2022-08-11 RX ADMIN — Medication 3 ML: at 08:47

## 2022-08-11 RX ADMIN — METRONIDAZOLE 500 MG: 500 INJECTION, SOLUTION INTRAVENOUS at 02:47

## 2022-08-11 RX ADMIN — Medication 3 ML: at 21:00

## 2022-08-11 RX ADMIN — TRAMADOL HYDROCHLORIDE 50 MG: 50 TABLET, COATED ORAL at 21:02

## 2022-08-11 RX ADMIN — HYDROMORPHONE HYDROCHLORIDE 0.5 MG: 1 INJECTION, SOLUTION INTRAMUSCULAR; INTRAVENOUS; SUBCUTANEOUS at 04:59

## 2022-08-11 RX ADMIN — TRAMADOL HYDROCHLORIDE 50 MG: 50 TABLET, COATED ORAL at 08:49

## 2022-08-11 RX ADMIN — METRONIDAZOLE 500 MG: 500 INJECTION, SOLUTION INTRAVENOUS at 09:57

## 2022-08-11 RX ADMIN — DICYCLOMINE HYDROCHLORIDE 20 MG: 10 CAPSULE ORAL at 07:45

## 2022-08-11 RX ADMIN — MESALAMINE 2.4 G: 1.2 TABLET, DELAYED RELEASE ORAL at 08:50

## 2022-08-11 RX ADMIN — BUPROPION HYDROCHLORIDE 150 MG: 150 TABLET, EXTENDED RELEASE ORAL at 08:50

## 2022-08-11 RX ADMIN — LIDOCAINE 1 PATCH: 50 PATCH CUTANEOUS at 08:48

## 2022-08-11 RX ADMIN — DICYCLOMINE HYDROCHLORIDE 20 MG: 10 CAPSULE ORAL at 20:59

## 2022-08-11 RX ADMIN — FAMOTIDINE 40 MG: 20 TABLET ORAL at 08:50

## 2022-08-11 RX ADMIN — METHYLPREDNISOLONE SODIUM SUCCINATE 20 MG: 40 INJECTION, POWDER, FOR SOLUTION INTRAMUSCULAR; INTRAVENOUS at 09:18

## 2022-08-11 RX ADMIN — PROMETHAZINE HYDROCHLORIDE 25 MG: 25 TABLET ORAL at 04:59

## 2022-08-11 RX ADMIN — METHYLPREDNISOLONE SODIUM SUCCINATE 20 MG: 40 INJECTION, POWDER, FOR SOLUTION INTRAMUSCULAR; INTRAVENOUS at 17:12

## 2022-08-11 RX ADMIN — SODIUM CHLORIDE 100 ML/HR: 9 INJECTION, SOLUTION INTRAVENOUS at 17:08

## 2022-08-11 RX ADMIN — METHYLPREDNISOLONE SODIUM SUCCINATE 20 MG: 40 INJECTION, POWDER, FOR SOLUTION INTRAMUSCULAR; INTRAVENOUS at 02:47

## 2022-08-11 RX ADMIN — DICYCLOMINE HYDROCHLORIDE 20 MG: 10 CAPSULE ORAL at 17:12

## 2022-08-11 RX ADMIN — METRONIDAZOLE 500 MG: 500 INJECTION, SOLUTION INTRAVENOUS at 17:44

## 2022-08-11 NOTE — CONSULTS
"Nutrition Services    Patient Name: Sanam Tijerina  YOB: 1980  MRN: [id number]  Admission date: 8/8/2022      PPE Documentation        PPE Worn By Provider mask, gloves and eye protection   PPE Worn By Patient  none     CLINICAL NUTRITION ASSESSMENT      Reason for Assessment 8/11: RANDAL, MST 2     H&P      Past Medical History:   Diagnosis Date   • Ulcerative colitis (HCC)        Past Surgical History:   Procedure Laterality Date   • HYSTERECTOMY          Current Problems   LLQ Abdominal pain    UC    Mood disorder       Encounter Information        Trending Narrative     8/11: Visited patient in room, reports a typical good intake and use of a protein shake every morning ([employer]) as well as using a probiotic and digestive enzymes. States over the last 2 weeks has been unable to eat 2/2 diarrhea. Typically eats gluten free at home, choosing items that are naturally gluten free on Regular diet. Discussed options for gluten free products as needed, declines RD ordering a gluten free diet at this time. Pt not interested in any nutrition supplements at this time.     Anthropometrics        Current Height, Weight Height: 160 cm (63\")  Weight: 63.4 kg (139 lb 12.4 oz) (08/08/22 1941)       Ideal Body Weight (IBW) 115lb   Usual Body Weight (UBW) 155lb       Trending Weight Hx     This admission: 8/11: new admit             PTA: 8/11: no weights available PTA, per UBW recall, down 10% in less than 1 month    Wt Readings from Last 30 Encounters:   08/08/22 1941 63.4 kg (139 lb 12.4 oz)   08/08/22 1142 62.4 kg (137 lb 9.1 oz)      BMI kg/m2 Body mass index is 24.76 kg/m².       Labs        Pertinent Labs    Results from last 7 days   Lab Units 08/11/22  0126 08/10/22  0238 08/08/22  2346 08/08/22  1244   SODIUM mmol/L 138 140 136 136   POTASSIUM mmol/L 4.5 4.0 4.2 4.4   CHLORIDE mmol/L 104 105 99 98   CO2 mmol/L 26.0 27.0 27.0 27.0   BUN mg/dL 8 7 6 6   CREATININE mg/dL 0.64 0.59 0.73 0.77   CALCIUM mg/dL " 8.5* 8.3* 8.5* 9.5   BILIRUBIN mg/dL <0.2 <0.2  --  0.3   ALK PHOS U/L 59 77  --  91   ALT (SGPT) U/L 10 9  --  15   AST (SGOT) U/L 9 6  --  13   GLUCOSE mg/dL 169* 174* 104* 112*     Results from last 7 days   Lab Units 08/11/22  0126   HEMOGLOBIN g/dL 11.0*   HEMATOCRIT % 34.7     COVID19   Date Value Ref Range Status   08/08/2022 Not Detected Not Detected - Ref. Range Final     No results found for: HGBA1C     Medications    Scheduled Medications buPROPion XL, 150 mg, Oral, Daily  dicyclomine, 20 mg, Oral, 4x Daily  famotidine, 40 mg, Oral, Daily  lidocaine, 1 patch, Transdermal, Q24H  mesalamine, 2.4 g, Oral, BID  methylPREDNISolone sodium succinate, 20 mg, Intravenous, Q8H  metroNIDAZOLE, 500 mg, Intravenous, Q8H  sodium chloride, 3 mL, Intravenous, Q12H        Infusions sodium chloride, 100 mL/hr, Last Rate: 100 mL/hr (08/11/22 0444)        PRN Medications •  acetaminophen  •  ALPRAZolam  •  potassium chloride  •  potassium chloride  •  promethazine  •  sodium chloride  •  sodium chloride  •  traMADol     Physical Findings        Trending Physical   Appearance, NFPE 8/11: NFPE completed, no s/s of malnutrition per physical exam   --  Edema  none     Bowel Function BM 8/10   Tubes none     Chewing/Swallowing No issues reported     Skin No breakdown      --  Current Nutrition Orders & Evaluation of Intake       Oral Nutrition     Food Allergies NKFA   Current PO Diet Diet Regular   Supplement    PO Evaluation     Trending % PO Intake %   --  Nutritional Risk Screening        NRS-2002 Score          Nutrition Diagnosis         Nutrition Dx Problem 1 Unintentional weight loss related to decreased po intake with diarrhea as evidenced by 10% weight loss in less than 1 month per patient report.      Nutrition Dx Problem 2        Intervention Goal         Intervention Goal(s) Meal intake at least 75%  Stable weight     Nutrition Intervention        RD Action Continue Regular diet     Nutrition Prescription           Diet Prescription Regular    Supplement Prescription    --  Monitor/Evaluation        Monitor PO intake, Pertinent labs, Weight, Skin status, GI status, Symptoms         Electronically signed by:  Oanh Weber RD  08/11/22 11:47 EDT

## 2022-08-11 NOTE — PLAN OF CARE
Goal Outcome Evaluation:              Outcome Evaluation: Patient alert and oriented x 4. Complains of left sided abdominal pain, constant, aching. No nausea or vomiting noted today. Vital signs stable. No distress noted.

## 2022-08-11 NOTE — PLAN OF CARE
Goal Outcome Evaluation:  Plan of Care Reviewed With: patient     Problem: Adult Inpatient Plan of Care  Goal: Plan of Care Review  Outcome: Ongoing, Progressing  Flowsheets (Taken 8/11/2022 0035)  Progress: no change  Plan of Care Reviewed With: patient        Progress: no change

## 2022-08-11 NOTE — PROGRESS NOTES
LOS: 0 days   Patient Care Team:  Virgil Burrell MD as PCP - General (Family Medicine)  Kristy Ibarra MD as Consulting Physician (Gastroenterology)    Subjective     She continues with diarrhea and pain    Review of Systems   Constitutional: Positive for appetite change.   HENT: Negative.    Respiratory: Negative.    Cardiovascular: Negative.    Gastrointestinal: Positive for abdominal pain and diarrhea.   Genitourinary: Negative.    Musculoskeletal: Negative.    Neurological: Negative.    Psychiatric/Behavioral: Negative.            Objective     Vital Signs  Temp:  [98.5 °F (36.9 °C)-98.8 °F (37.1 °C)] 98.5 °F (36.9 °C)  Heart Rate:  [] 73  Resp:  [16-18] 17  BP: (114-150)/(65-92) 114/68      Physical Exam  Vitals reviewed.   Constitutional:       Appearance: She is not ill-appearing.   HENT:      Head: Normocephalic and atraumatic.      Right Ear: External ear normal.      Left Ear: External ear normal.      Nose: Nose normal.      Mouth/Throat:      Mouth: Mucous membranes are moist.   Eyes:      General:         Right eye: No discharge.         Left eye: No discharge.   Cardiovascular:      Rate and Rhythm: Normal rate and regular rhythm.      Pulses: Normal pulses.      Heart sounds: Normal heart sounds.   Pulmonary:      Effort: Pulmonary effort is normal.      Breath sounds: Normal breath sounds.   Abdominal:      General: Bowel sounds are normal.      Palpations: Abdomen is soft.   Musculoskeletal:         General: Normal range of motion.      Cervical back: Normal range of motion.   Skin:     General: Skin is warm and dry.   Neurological:      Mental Status: She is alert and oriented to person, place, and time.   Psychiatric:         Behavior: Behavior normal.              Results Review:    Lab Results (last 24 hours)     Procedure Component Value Units Date/Time    Comprehensive Metabolic Panel [[id number]]  (Abnormal) Collected: 08/11/22 0126    Specimen: Blood Updated: 08/11/22 0246      Glucose 169 mg/dL      BUN 8 mg/dL      Creatinine 0.64 mg/dL      Sodium 138 mmol/L      Potassium 4.5 mmol/L      Comment: Slight hemolysis detected by analyzer. Results may be affected.        Chloride 104 mmol/L      CO2 26.0 mmol/L      Calcium 8.5 mg/dL      Total Protein 6.0 g/dL      Albumin 3.30 g/dL      ALT (SGPT) 10 U/L      AST (SGOT) 9 U/L      Alkaline Phosphatase 59 U/L      Total Bilirubin <0.2 mg/dL      Globulin 2.7 gm/dL      A/G Ratio 1.2 g/dL      BUN/Creatinine Ratio 12.5     Anion Gap 8.0 mmol/L      eGFR 114.0 mL/min/1.73      Comment: National Kidney Foundation and American Society of Nephrology (ASN) Task Force recommended calculation based on the Chronic Kidney Disease Epidemiology Collaboration (CKD-EPI) equation refit without adjustment for race.       Narrative:      GFR Normal >60  Chronic Kidney Disease <60  Kidney Failure <15      CBC & Differential [[id number]]  (Abnormal) Collected: 08/11/22 0126    Specimen: Blood Updated: 08/11/22 0222    Narrative:      The following orders were created for panel order CBC & Differential.  Procedure                               Abnormality         Status                     ---------                               -----------         ------                     CBC Auto Differential[[id number]]        Abnormal            Final result                 Please view results for these tests on the individual orders.    CBC Auto Differential [[id number]]  (Abnormal) Collected: 08/11/22 0126    Specimen: Blood Updated: 08/11/22 0222     WBC 22.70 10*3/mm3      RBC 3.96 10*6/mm3      Hemoglobin 11.0 g/dL      Hematocrit 34.7 %      MCV 87.7 fL      MCH 27.9 pg      MCHC 31.8 g/dL      RDW 12.8 %      RDW-SD 38.9 fl      MPV 7.4 fL      Platelets 474 10*3/mm3      Neutrophil % 86.0 %      Lymphocyte % 8.3 %      Monocyte % 5.6 %      Eosinophil % 0.0 %      Basophil % 0.1 %      Neutrophils, Absolute 19.50 10*3/mm3      Lymphocytes, Absolute 1.90 10*3/mm3       Monocytes, Absolute 1.30 10*3/mm3      Eosinophils, Absolute 0.00 10*3/mm3      Basophils, Absolute 0.00 10*3/mm3      nRBC 0.0 /100 WBC     Blood Culture - Blood, Arm, Right [[id number]]  (Normal) Collected: 08/08/22 1255    Specimen: Blood from Arm, Right Updated: 08/10/22 1315     Blood Culture No growth at 2 days    Blood Culture - Blood, Arm, Left [[id number]]  (Normal) Collected: 08/08/22 1244    Specimen: Blood from Arm, Left Updated: 08/10/22 1302     Blood Culture No growth at 2 days           Imaging Results (Last 24 Hours)     ** No results found for the last 24 hours. **               I reviewed the patient's new clinical results.    Medication Review:   Scheduled Meds:buPROPion XL, 150 mg, Oral, Daily  dicyclomine, 20 mg, Oral, 4x Daily  famotidine, 40 mg, Oral, Daily  lidocaine, 1 patch, Transdermal, Q24H  mesalamine, 2.4 g, Oral, BID  methylPREDNISolone sodium succinate, 20 mg, Intravenous, Q8H  metroNIDAZOLE, 500 mg, Intravenous, Q8H  sodium chloride, 3 mL, Intravenous, Q12H      Continuous Infusions:sodium chloride, 100 mL/hr, Last Rate: 100 mL/hr (08/11/22 0444)      PRN Meds:.•  acetaminophen  •  ALPRAZolam  •  potassium chloride  •  potassium chloride  •  promethazine  •  sodium chloride  •  sodium chloride  •  traMADol     Interval History:    Assessment & Plan     LLQ abdominal pain  Ulcerative colitis  Leukocytosis  -antibiotic zosyn  -steroids  -GI following     Mood disorder-wellbutrin  Low back pain - CT abd with incidental finding of L5 spondylolytic defect with grade 1 anterolisthesis. Lidoderm patch . May need further outpt work up     Diet:regular  DVT prophylaxis:scd  GI prophylaxis:pepcid  Code status:full         Plan for disposition: Home     TerriMERISSA Brown  08/11/22  08:59 EDT

## 2022-08-11 NOTE — PROGRESS NOTES
" LOS: 0 days   Patient Care Team:  Virgil Burrell MD as PCP - General (Family Medicine)  Kristy Ibarra MD as Consulting Physician (Gastroenterology)      Subjective \" I am doing some better\"    Interval History:     Subjective: No nausea or vomiting and tolerating diet.  Less fatigue.  No rectal bleeding.  She continues to have abdominal cramping with fecal urgency and reports 8 bowel movements in the last 24 hours.      ROS:   No chest pain, shortness of breath, or cough.        Medication Review:     Current Facility-Administered Medications:   •  acetaminophen (TYLENOL) tablet 650 mg, 650 mg, Oral, Q4H PRN, Anamika Greer MD  •  ALPRAZolam (XANAX) tablet 0.25 mg, 0.25 mg, Oral, Nightly PRN, Terri Wiseman APRN, 0.25 mg at 08/10/22 2321  •  buPROPion XL (WELLBUTRIN XL) 24 hr tablet 150 mg, 150 mg, Oral, Daily, Terri Wiseman APRN, 150 mg at 08/10/22 0739  •  dicyclomine (BENTYL) capsule 20 mg, 20 mg, Oral, 4x Daily, Terri Wiseman APRN, 20 mg at 08/11/22 0745  •  famotidine (PEPCID) tablet 40 mg, 40 mg, Oral, Daily, Anamika Greer MD, 40 mg at 08/10/22 0739  •  lidocaine (LIDODERM) 5 % 1 patch, 1 patch, Transdermal, Q24H, Quin Ramsey APRN, 1 patch at 08/10/22 1205  •  mesalamine (LIALDA) EC tablet 2.4 g, 2.4 g, Oral, BID, Eri Stockton APRN, 2.4 g at 08/10/22 2037  •  methylPREDNISolone sodium succinate (SOLU-Medrol) injection 20 mg, 20 mg, Intravenous, Q8H, Eri Stockton APRN, 20 mg at 08/11/22 0247  •  metroNIDAZOLE (FLAGYL) IVPB 500 mg, 500 mg, Intravenous, Q8H, Eri Stockton APRN, Stopped at 08/11/22 0317  •  potassium chloride (K-DUR,KLOR-CON) CR tablet 40 mEq, 40 mEq, Oral, PRN, Anamika Greer MD  •  potassium chloride (KLOR-CON) packet 40 mEq, 40 mEq, Oral, PRN, Anamika Greer MD  •  promethazine (PHENERGAN) tablet 25 mg, 25 mg, Oral, Q6H PRN, Anamika Greer MD, 25 mg at 08/11/22 0459  •  sodium chloride 0.9 % flush 10 mL, 10 mL, Intravenous, PRN, Anamika Greer MD  •  sodium chloride 0.9 % flush " 3 mL, 3 mL, Intravenous, Q12H, Anamika Greer MD, 3 mL at 08/10/22 2036  •  sodium chloride 0.9 % flush 3-10 mL, 3-10 mL, Intravenous, PRN, Anamika Greer MD  •  sodium chloride 0.9 % infusion, 100 mL/hr, Intravenous, Continuous, Anamika Greer MD, Last Rate: 100 mL/hr at 08/11/22 0444, 100 mL/hr at 08/11/22 0444  •  traMADol (ULTRAM) tablet 50 mg, 50 mg, Oral, Q6H PRN, Eri Stockton, APRN      Objective Resting in bed, no acute distress, no family present    Vital Signs  Vitals:    08/10/22 2032 08/10/22 2319 08/11/22 0442 08/11/22 0700   BP: 150/92 139/73 132/76 114/68   BP Location: Left arm Left arm Left arm Left arm   Patient Position: Lying Lying Lying Lying   Pulse: 100 97 100 73   Resp: 16 18 18 17   Temp: 98.7 °F (37.1 °C) 98.5 °F (36.9 °C) 98.6 °F (37 °C) 98.5 °F (36.9 °C)   TempSrc: Oral Oral Oral Oral   SpO2: 100% 97% 100% 97%   Weight:       Height:           Physical Exam:     General Appearance:    Awake and alert, in no acute distress   Head:    Normocephalic, without obvious abnormality   Eyes:          Conjunctivae normal, anicteric sclera   Throat:   No oral lesions, no thrush, oral mucosa moist   Neck:   supple, no JVD   Lungs:     respirations regular, even and unlabored       Rectal:     Deferred   Extremities:   No edema, no cyanosis   Skin:   No bruising or rash, no jaundice        Results Review:    CBC    Results from last 7 days   Lab Units 08/11/22  0126 08/10/22  0238 08/08/22  2346 08/08/22  1244   WBC 10*3/mm3 22.70* 17.60* 22.40* 19.10*   HEMOGLOBIN g/dL 11.0* 10.7* 12.6 14.5   PLATELETS 10*3/mm3 474* 472* 456* 528*     CMP   Results from last 7 days   Lab Units 08/11/22  0126 08/10/22  0238 08/08/22  2346 08/08/22  1244   SODIUM mmol/L 138 140 136 136   POTASSIUM mmol/L 4.5 4.0 4.2 4.4   CHLORIDE mmol/L 104 105 99 98   CO2 mmol/L 26.0 27.0 27.0 27.0   BUN mg/dL 8 7 6 6   CREATININE mg/dL 0.64 0.59 0.73 0.77   GLUCOSE mg/dL 169* 174* 104* 112*   ALBUMIN g/dL 3.30* 3.00*  --  4.00    BILIRUBIN mg/dL <0.2 <0.2  --  0.3   ALK PHOS U/L 59 77  --  91   AST (SGOT) U/L 9 6  --  13   ALT (SGPT) U/L 10 9  --  15   LIPASE U/L  --   --   --  14     Cr Clearance Estimated Creatinine Clearance: 103.7 mL/min (by C-G formula based on SCr of 0.64 mg/dL).  Coag     HbA1C No results found for: HGBA1C  Blood Glucose No results found for: POCGLU  Infection   Results from last 7 days   Lab Units 08/08/22  1255 08/08/22  1244   BLOODCX  No growth at 2 days No growth at 2 days     UA    Results from last 7 days   Lab Units 08/08/22  1306   NITRITE UA  Negative   WBC UA /HPF 0-2*   BACTERIA UA /HPF None Seen   SQUAM EPITHEL UA /HPF 0-2     Radiology(recent) No radiology results for the last day       Assessment & Plan   Ulcerative colitis with flare  Leukocytosis -likely secondary to above  Anxiety     PLAN:  Some improvement overnight but still having significant diarrhea, no further rectal bleeding.  Continue dicyclomine 4 times daily, IV steroids and mesalamine.  Continue IV Flagyl while inpatient.  Okay for diet as tolerated.  Discontinue narcotics as this increases mortality risk in inflammatory bowel disease patients.  Okay for tramadol as needed for pain.  Continue supportive care and hopefully home tomorrow.  Discussed with bedside RN this morning on rounds.    Electronically signed by MERISSA Cm, 08/11/22, 8:35 AM EDT.

## 2022-08-11 NOTE — PAYOR COMM NOTE
"AUTHORIZATION PENDING:   PLEASE CALL OR FAX DETERMINATION TO CONTACT BELOW. THANK YOU.        Zena Bourgeois RN MSN  /UR  [facility]  [phone] office  [phone] fax  [email or web address]    [facility]  NPI: [id number]  Tax: [phone]          Farzaneh Joyce (42 y.o. Female)             Date of Birth   1980    Social Security Number       Address   [address] [place] [address] [place] [facility]    Home Phone   [phone]    MRN   [id number]       Zoroastrian   Religion    Marital Status                               Admission Date   8/8/22    Admission Type   Emergency    Admitting Provider   Vickie Marshall MD    Attending Provider   Vickie Marshall MD    Department, Room/Bed   [facility] 2A PEDIATRICS, 202/1       Discharge Date       Discharge Disposition       Discharge Destination                               Attending Provider: Vickie Marshall MD    Allergies: Flagyl [Metronidazole]    Isolation: None   Infection: None   Code Status: CPR   Advance Care Planning Activity    Ht: 160 cm (63\")   Wt: 63.4 kg (139 lb 12.4 oz)    Admission Cmt: None   Principal Problem: None                Active Insurance as of 8/8/2022     Primary Coverage     Payor Plan Insurance Group Employer/Plan Group    [facility] [employer] [facility] [employer] [facility] PPO [id number]     Payor Plan Address Payor Plan Phone Number Payor Plan Fax Number Effective Dates    [address] [phone]  4/1/2022 - None Entered    [place] [id number]       Subscriber Name Subscriber Birth Date Member ID       FARZANEH JOYCE 1980 [id number]                 Emergency Contacts      (Rel.) Home Phone Work Phone Mobile Phone    Richy Tejada (Mother) -- -- [phone]        08/11/22 1116  Inpatient Admission  Once     Completed     Level of Care: Med/Surg    Diagnosis: LLQ abdominal pain [333356]    Admitting Physician: VICKIE MARSHALL [[id number]]    Attending Physician: " VICKIE GREER [[id number]]    Certification: I Certify That Inpatient Hospital Services Are Medically Necessary For Greater Than 2 Midnights        08/11/22 1115   08/08/22 1841  Initiate Observation Status  Once     Completed     Level of Care: Med/Surg    Diagnosis: LLQ abdominal pain [398081]    Admitting Physician: VICKIE GREER [[id number]]    Attending Physician: VICKIE GREER [[id number]]                    History & Physical      Terri Wiseman, APRN at 08/09/22 0846     Attestation signed by Vickie Greer MD at 08/09/22 1823    I have reviewed this documentation and agree. I have interviewed and examined patient, reviewed test results and directed the plan of care.  She has improved overnight with antibiotics and steroids.  Will continue same.                      Patient Care Team:  Virgil Burrell MD as PCP - General (Family Medicine)  Kristy Ibarra MD as Consulting Physician (Gastroenterology)    Chief complaint LL quadrant pain    Subjective     Patient is a 42 y.o. female who presents with past medical history of ulcerative colitis had been diagnosed over 20 years ago.  She has been in remission for at least 2 years.  She sees Dr. Ibarra with GI.  She presented to the ED 8/8/2022 with complaints of intense left lower quadrant pain.  She states that she has had slowing of diarrhea since she is arrived in the hospital with therapy.  She has had light pink blood.  She admitted for further evaluation and treatment to include steroids, antibiotics and GI consult..      Review of Systems   Constitutional: Positive for activity change, appetite change and fatigue.   HENT: Negative.    Respiratory: Negative.    Cardiovascular: Negative.    Gastrointestinal: Positive for diarrhea.   Genitourinary: Negative.    Musculoskeletal: Negative.    Neurological: Negative.    Psychiatric/Behavioral: Negative.           History  Past Medical History:   Diagnosis Date   • Ulcerative colitis (HCC)      Past Surgical History:   Procedure Laterality  Date   • HYSTERECTOMY       History reviewed. No pertinent family history.  Social History     Tobacco Use   • Smoking status: Never Smoker   • Smokeless tobacco: Never Used   Vaping Use   • Vaping Use: Never used   Substance Use Topics   • Alcohol use: Never   • Drug use: Never     Medications Prior to Admission   Medication Sig Dispense Refill Last Dose   • ALPRAZolam (XANAX) 0.25 MG tablet Take 0.25 mg by mouth At Night As Needed for Anxiety.      • Budesonide (ENTOCORT EC) 3 MG 24 hr capsule Take 9 mg by mouth Every Morning.      • buPROPion XL (WELLBUTRIN XL) 150 MG 24 hr tablet Take 150 mg by mouth Daily.      • dicyclomine (BENTYL) 20 MG tablet Take 20 mg by mouth Every 6 (Six) Hours.      • mesalamine (ROWASA) 4 g enema Insert 1 g into the rectum Every Night. Suppository        Allergies:  Flagyl [metronidazole]    Objective     Vital Signs  Temp:  [98 °F (36.7 °C)-99.4 °F (37.4 °C)] 98.1 °F (36.7 °C)  Heart Rate:  [] 97  Resp:  [14-17] 15  BP: (107-161)/() 121/80       Physical Exam  Vitals reviewed.   HENT:      Head: Normocephalic.      Right Ear: External ear normal.      Left Ear: External ear normal.      Nose: Nose normal.      Mouth/Throat:      Mouth: Mucous membranes are moist.   Eyes:      General:         Right eye: No discharge.         Left eye: No discharge.   Cardiovascular:      Rate and Rhythm: Normal rate and regular rhythm.      Pulses: Normal pulses.      Heart sounds: Normal heart sounds.   Pulmonary:      Effort: Pulmonary effort is normal.      Breath sounds: Normal breath sounds.   Abdominal:      General: Bowel sounds are normal.      Palpations: Abdomen is soft.   Musculoskeletal:         General: Normal range of motion.      Cervical back: Normal range of motion.   Skin:     General: Skin is warm and dry.   Neurological:      Mental Status: She is alert and oriented to person, place, and time.   Psychiatric:         Behavior: Behavior normal.          Results Review:      Imaging Results (Last 24 Hours)     Procedure Component Value Units Date/Time    CT Abdomen Pelvis With Contrast [[id number]] Collected: 08/08/22 1534     Updated: 08/08/22 1539    Narrative:      CT ABDOMEN PELVIS W CONTRAST-     Date of Exam: 8/8/2022 3:14 PM     Indication: LLQ pain radiating into L flank.     Comparison: None available.     Technique: Contiguous axial CT images were obtained from the lung bases  to the pubic symphysis following uneventful administration of 100 cc  Isovue-370 intravenous and oral contrast. Sagittal and coronal  reconstructions were performed.  Automated exposure control and  iterative reconstruction methods were used.     FINDINGS:     There is diffuse thickening and mucosal hyperenhancement in the mid to  distal transverse colon, descending colon, sigmoid colon, and rectum.  There is relative sparing of the ascending colon and proximal transverse  colon. FINDINGS suggest infectious/inflammatory colitis. The appendix is  normal. There is no indication of bowel obstruction. No pneumatosis,  abscess or free air is seen. There is no indication of bowel  obstruction.     The liver, gallbladder, spleen, pancreas, adrenals, and right kidney are  normal. A 3 mm nonobstructing stone is seen within the left lower renal  pole. The abdominal aorta caliber is normal. There are no pathologically  enlarged lymph nodes. There is no ascites.     The lung bases are free of acute airspace disease. Heart size is within  normal limits.     Bilateral L5 pars defects with grade 1 anterolisthesis L5 upon S1. No  acute or suspicious osseous abnormalities.          Impression:         1. FINDINGS consistent with diffuse infectious/inflammatory colitis of  the mid to distal transverse colon through the rectum. Similar  distribution of colon inflammation can be seen in cases of ulcerative  colitis. Correlate clinically. No evidence of bowel obstruction.  2. Bilateral L5 spondylolytic defects with grade  1 anterolisthesis L5  upon S1.           Electronically Signed By-Joanne Mustafa MD On:8/8/2022 3:37 PM  This report was finalized on [id number] by  Joanne Mustafa MD.           Lab Results (last 24 hours)     Procedure Component Value Units Date/Time    Basic Metabolic Panel [[id number]]  (Abnormal) Collected: 08/08/22 2346    Specimen: Blood Updated: 08/09/22 0103     Glucose 104 mg/dL      BUN 6 mg/dL      Creatinine 0.73 mg/dL      Sodium 136 mmol/L      Potassium 4.2 mmol/L      Chloride 99 mmol/L      CO2 27.0 mmol/L      Calcium 8.5 mg/dL      BUN/Creatinine Ratio 8.2     Anion Gap 10.0 mmol/L      eGFR 106.1 mL/min/1.73      Comment: National Kidney Foundation and American Society of Nephrology (ASN) Task Force recommended calculation based on the Chronic Kidney Disease Epidemiology Collaboration (CKD-EPI) equation refit without adjustment for race.       Narrative:      GFR Normal >60  Chronic Kidney Disease <60  Kidney Failure <15      CBC & Differential [[id number]]  (Abnormal) Collected: 08/08/22 2346    Specimen: Blood Updated: 08/09/22 0044    Narrative:      The following orders were created for panel order CBC & Differential.  Procedure                               Abnormality         Status                     ---------                               -----------         ------                     CBC Auto Differential[[id number]]        Abnormal            Final result                 Please view results for these tests on the individual orders.    CBC Auto Differential [[id number]]  (Abnormal) Collected: 08/08/22 2346    Specimen: Blood Updated: 08/09/22 0044     WBC 22.40 10*3/mm3      RBC 4.46 10*6/mm3      Hemoglobin 12.6 g/dL      Hematocrit 38.6 %      MCV 86.5 fL      MCH 28.3 pg      MCHC 32.7 g/dL      RDW 13.0 %      RDW-SD 39.8 fl      MPV 7.3 fL      Platelets 456 10*3/mm3      Neutrophil % 87.5 %      Lymphocyte % 9.1 %      Monocyte % 2.7 %      Eosinophil % 0.6 %      Basophil % 0.1 %       Neutrophils, Absolute 19.60 10*3/mm3      Lymphocytes, Absolute 2.00 10*3/mm3      Monocytes, Absolute 0.60 10*3/mm3      Eosinophils, Absolute 0.10 10*3/mm3      Basophils, Absolute 0.00 10*3/mm3      nRBC 0.2 /100 WBC     COVID PRE-OP / PRE-PROCEDURE SCREENING ORDER (NO ISOLATION) - Swab, Nasopharynx [[id number]]  (Normal) Collected: 08/08/22 1733    Specimen: Swab from Nasopharynx Updated: 08/08/22 1827    Narrative:      The following orders were created for panel order COVID PRE-OP / PRE-PROCEDURE SCREENING ORDER (NO ISOLATION) - Swab, Nasopharynx.  Procedure                               Abnormality         Status                     ---------                               -----------         ------                     COVID-19,CEPHEID/BALA,CO...[[id number]]  Normal              Final result                 Please view results for these tests on the individual orders.    COVID-19,CEPHEID/BALA,COR/PEG/PAD/OZIEL IN-HOUSE(OR EMERGENT/ADD-ON),NP SWAB IN TRANSPORT MEDIA 3-4 HR TAT, RT-PCR - Swab, Nasopharynx [[id number]]  (Normal) Collected: 08/08/22 1733    Specimen: Swab from Nasopharynx Updated: 08/08/22 1827     COVID19 Not Detected    Narrative:      Fact sheet for providers: https://www.fda.gov/media/714746/download     Fact sheet for patients: https://www.fda.gov/media/986161/download  Fact sheet for providers: https://www.fda.gov/media/391577/download     Fact sheet for patients: https://www.fda.gov/media/066926/download    Gastrointestinal Panel, PCR - Stool, Per Rectum [[id number]]  (Normal) Collected: 08/08/22 1258    Specimen: Stool from Per Rectum Updated: 08/08/22 1434     Campylobacter Not Detected     Plesiomonas shigelloides Not Detected     Salmonella Not Detected     Vibrio Not Detected     Vibrio cholerae Not Detected     Yersinia enterocolitica Not Detected     Enteroaggregative E. coli (EAEC) Not Detected     Enteropathogenic E. coli (EPEC) Not Detected     Enterotoxigenic E. coli (ETEC) lt/st  Not Detected     Shiga-like toxin-producing E. coli (STEC) stx1/stx2 Not Detected     Shigella/Enteroinvasive E. coli (EIEC) Not Detected     Cryptosporidium Not Detected     Cyclospora cayetanensis Not Detected     Entamoeba histolytica Not Detected     Giardia lamblia Not Detected     Adenovirus F40/41 Not Detected     Astrovirus Not Detected     Norovirus GI/GII Not Detected     Rotavirus A Not Detected     Sapovirus (I, II, IV or V) Not Detected    Narrative:      If Aeromonas, Staphylococcus aureus or Bacillus cereus are suspected, please order [id number]: Stool Culture, Aeromonas, S aureus, B Cereus.    [place] Draw [[id number]] Collected: 08/08/22 1244    Specimen: Blood Updated: 08/08/22 1348    Narrative:      The following orders were created for panel order [place] Draw.  Procedure                               Abnormality         Status                     ---------                               -----------         ------                     Green Top (Gel)[[id number]]                                  Final result               Lavender Top[[id number]]                                     Final result               Gold Top - SST[[id number]]                                   Final result               Light Blue Top[[id number]]                                                                Please view results for these tests on the individual orders.    Lavender Top [[id number]] Collected: 08/08/22 1244    Specimen: Blood Updated: 08/08/22 1348     Extra Tube hold for add-on     Comment: Auto resulted       Gold Top - SST [[id number]] Collected: 08/08/22 1244    Specimen: Blood Updated: 08/08/22 1348     Extra Tube Hold for add-ons.     Comment: Auto resulted.       Fecal Lactoferrin Qual. - Stool, Per Rectum [[id number]]  (Normal) Collected: 08/08/22 1258    Specimen: Stool from Per Rectum Updated: 08/08/22 1324     Lactoferrin, Qual Negative    Urinalysis With Microscopic If Indicated (No Culture) - Urine, Clean  Catch [[id number]]  (Abnormal) Collected: 08/08/22 1306    Specimen: Urine, Clean Catch Updated: 08/08/22 1320     Color, UA Yellow     Appearance, UA Clear     pH, UA 7.5     Specific Gravity, UA 1.010     Glucose, UA Negative     Ketones, UA 40 mg/dL (2+)     Bilirubin, UA Negative     Blood, UA Trace     Protein, UA Trace     Leuk Esterase, UA Negative     Nitrite, UA Negative     Urobilinogen, UA 0.2 E.U./dL    Urinalysis, Microscopic Only - Urine, Clean Catch [[id number]]  (Abnormal) Collected: 08/08/22 1306    Specimen: Urine, Clean Catch Updated: 08/08/22 1320     RBC, UA 6-12 /HPF      WBC, UA 0-2 /HPF      Bacteria, UA None Seen /HPF      Squamous Epithelial Cells, UA 0-2 /HPF      Hyaline Casts, UA None Seen /LPF      Methodology Automated Microscopy    Green Top (Gel) [[id number]] Collected: 08/08/22 1244    Specimen: Blood Updated: 08/08/22 1317     Extra Tube done    Comprehensive Metabolic Panel [[id number]]  (Abnormal) Collected: 08/08/22 1244    Specimen: Blood Updated: 08/08/22 1316     Glucose 112 mg/dL      BUN 6 mg/dL      Creatinine 0.77 mg/dL      Sodium 136 mmol/L      Potassium 4.4 mmol/L      Chloride 98 mmol/L      CO2 27.0 mmol/L      Calcium 9.5 mg/dL      Total Protein 8.0 g/dL      Albumin 4.00 g/dL      ALT (SGPT) 15 U/L      AST (SGOT) 13 U/L      Alkaline Phosphatase 91 U/L      Total Bilirubin 0.3 mg/dL      Globulin 4.0 gm/dL      A/G Ratio 1.0 g/dL      BUN/Creatinine Ratio 7.8     Anion Gap 11.0 mmol/L      eGFR 99.5 mL/min/1.73      Comment: National Kidney Foundation and American Society of Nephrology (ASN) Task Force recommended calculation based on the Chronic Kidney Disease Epidemiology Collaboration (CKD-EPI) equation refit without adjustment for race.       Narrative:      GFR Normal >60  Chronic Kidney Disease <60  Kidney Failure <15      C-reactive Protein [[id number]]  (Abnormal) Collected: 08/08/22 1244    Specimen: Blood Updated: 08/08/22 1316     C-Reactive Protein 13.40  mg/dL     Lipase [[id number]]  (Normal) Collected: 08/08/22 1244    Specimen: Blood Updated: 08/08/22 1316     Lipase 14 U/L     Occult Blood, Fecal By Immunoassay - Stool, Per Rectum [[id number]]  (Abnormal) Collected: 08/08/22 1258    Specimen: Stool from Per Rectum Updated: 08/08/22 1316     Occult Blood, Fecal by Immunoassay Positive    Blood Culture - Blood, Arm, Right [[id number]] Collected: 08/08/22 1255    Specimen: Blood from Arm, Right Updated: 08/08/22 1304    Sedimentation Rate [[id number]]  (Abnormal) Collected: 08/08/22 1244    Specimen: Blood Updated: 08/08/22 1303     Sed Rate 40 mm/hr     CBC & Differential [[id number]]  (Abnormal) Collected: 08/08/22 1244    Specimen: Blood Updated: 08/08/22 1251    Narrative:      The following orders were created for panel order CBC & Differential.  Procedure                               Abnormality         Status                     ---------                               -----------         ------                     CBC Auto Differential[[id number]]        Abnormal            Final result                 Please view results for these tests on the individual orders.    CBC Auto Differential [[id number]]  (Abnormal) Collected: 08/08/22 1244    Specimen: Blood Updated: 08/08/22 1251     WBC 19.10 10*3/mm3      RBC 5.03 10*6/mm3      Hemoglobin 14.5 g/dL      Hematocrit 43.0 %      MCV 85.5 fL      MCH 28.7 pg      MCHC 33.6 g/dL      RDW 12.9 %      RDW-SD 39.4 fl      MPV 6.9 fL      Platelets 528 10*3/mm3      Neutrophil % 84.8 %      Lymphocyte % 9.6 %      Monocyte % 5.1 %      Eosinophil % 0.2 %      Basophil % 0.3 %      Neutrophils, Absolute 16.20 10*3/mm3      Lymphocytes, Absolute 1.80 10*3/mm3      Monocytes, Absolute 1.00 10*3/mm3      Eosinophils, Absolute 0.00 10*3/mm3      Basophils, Absolute 0.10 10*3/mm3      nRBC 0.0 /100 WBC     Blood Culture - Blood, Arm, Left [[id number]] Collected: 08/08/22 1244    Specimen: Blood from Arm, Left Updated: 08/08/22  1249    POC Lactate [[id number]]  (Normal) Collected: 08/08/22 1248    Specimen: Blood Updated: 08/08/22 1249     Lactate 0.5 mmol/L      Comment: Serial Number: [id number]:  [id number]              I reviewed the patient's new clinical results.    Assessment & Plan       LLQ abdominal pain  Ulcerative colitis  Leukocytosis  -antibiotic zosyn  -steroids  -GI following     Mood disorder-wellbutrin    Diet:regular  DVT prophylaxis:scd  GI prophylaxis:pepcid  Code status:full      I discussed the patient's findings and my recommendations with patient.     Terri MERISSA Bradshaw  08/09/22  08:46 EDT        Electronically signed by Anamika Greer MD at 08/09/22 1823          Emergency Department Notes      Celi Miles APRN at 08/08/22 1250     Attestation signed by Cade Bell DO at 08/09/22 1343        NON FACE TO FACE: This visit was performed by BOTH a physician and an APC. I performed all aspects of the MDM as documented.  Cade Bell DO 8/9/2022 13:43 EDT                         Subjective   42-year-old female presents with a 2 week history of left lower quadrant pain that radiates to the left flank.  She reports ulcerative colitis flare  that she was seen by GI and started on budesonide without relief.  She denies fever chills but reports sweats.  She also reports melena nausea vomiting.  She denies hematemesis.    1. Location: LLQ  2. Quality: Shooting  3. Severity: Moderate to severe  4. Worsening factors: Bowel Movement  5. Alleviating factors: Denies  6. Onset: 2 weeks  7. Radiation: Left flank  8. Frequency: Constant with periods of intensity  9. Co-morbidities: Ulcerative colitis, anxiety.  10. Source: Patient            Review of Systems   Constitutional: Positive for appetite change, diaphoresis and fever. Negative for chills.   HENT: Negative for postnasal drip.    Respiratory: Negative for shortness of breath.    Cardiovascular: Negative for chest pain.   Gastrointestinal: Positive  for abdominal pain, blood in stool, nausea and vomiting. Negative for constipation and diarrhea.   Genitourinary: Negative for decreased urine volume, difficulty urinating, flank pain and hematuria.   Skin: Negative for color change, pallor and rash.   Allergic/Immunologic: Positive for immunocompromised state.   Hematological: Negative for adenopathy.   Psychiatric/Behavioral: Negative for confusion.   All other systems reviewed and are negative.      No past medical history on file.    Allergies   Allergen Reactions   • Flagyl [Metronidazole] GI Intolerance       No past surgical history on file.    No family history on file.    Social History     Socioeconomic History   • Marital status:            Objective   Physical Exam  Vitals and nursing note reviewed.   Constitutional:       General: She is awake. She is not in acute distress.     Appearance: Normal appearance. She is well-developed. She is not ill-appearing or toxic-appearing.   HENT:      Mouth/Throat:      Mouth: Mucous membranes are moist.      Pharynx: Oropharynx is clear.   Eyes:      General: No scleral icterus.     Extraocular Movements: Extraocular movements intact.      Pupils: Pupils are equal, round, and reactive to light.   Cardiovascular:      Rate and Rhythm: Regular rhythm. Tachycardia present.      Heart sounds: Normal heart sounds, S1 normal and S2 normal. Heart sounds not distant. No murmur heard.    No friction rub. No gallop.   Pulmonary:      Effort: Pulmonary effort is normal.      Breath sounds: Normal breath sounds and air entry.   Abdominal:      General: Abdomen is flat. Bowel sounds are normal. There is no distension.      Palpations: Abdomen is soft. There is no mass.      Tenderness: There is abdominal tenderness in the left lower quadrant. There is left CVA tenderness. There is no right CVA tenderness, guarding or rebound.      Hernia: No hernia is present.       Skin:     General: Skin is warm and dry.      Capillary  Refill: Capillary refill takes less than 2 seconds.      Coloration: Skin is not jaundiced or pale.      Findings: No rash.   Neurological:      Mental Status: She is alert and oriented to person, place, and time.      GCS: GCS eye subscore is 4. GCS verbal subscore is 5. GCS motor subscore is 6.   Psychiatric:         Mood and Affect: Mood normal.         Behavior: Behavior normal. Behavior is cooperative.         Thought Content: Thought content normal.         Judgment: Judgment normal.         Procedures          ED Course  ED Course as of 08/08/22 1647   Mon Aug 08, 2022   1508 Awaiting patient to go to CT. [AL]      ED Course User Index  [AL] Celi Miles, MERISSA    CT Abdomen Pelvis With Contrast    Result Date: 8/8/2022   1. FINDINGS consistent with diffuse infectious/inflammatory colitis of the mid to distal transverse colon through the rectum. Similar distribution of colon inflammation can be seen in cases of ulcerative colitis. Correlate clinically. No evidence of bowel obstruction. 2. 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    Electronically Signed By-Joanne Mustafa MD On:8/8/2022 3:37 PM This report was finalized on [id number] by  Joanne Mustafa MD.    Medications   sodium chloride 0.9 % flush 10 mL (has no administration in time range)   levoFLOXacin (LEVAQUIN) 750 mg/150 mL D5W (premix) (LEVAQUIN) 750 mg (has no administration in time range)   metroNIDAZOLE (FLAGYL) IVPB 500 mg (has no administration in time range)   lactated ringers bolus 1,000 mL (1,000 mL Intravenous New Bag 8/8/22 1307)   HYDROmorphone (DILAUDID) injection 1 mg (1 mg Intravenous Given 8/8/22 1307)   ondansetron (ZOFRAN) injection 4 mg (4 mg Intravenous Given 8/8/22 1307)   iopamidol (ISOVUE-370) 76 % injection 100 mL (100 mL Intravenous Given 8/8/22 1518)     Labs Reviewed   COMPREHENSIVE METABOLIC PANEL - Abnormal; Notable for the following components:       Result Value    Glucose 112 (*)      All other components within normal limits    Narrative:     GFR Normal >60  Chronic Kidney Disease <60  Kidney Failure <15     URINALYSIS W/ MICROSCOPIC IF INDICATED (NO CULTURE) - Abnormal; Notable for the following components:    Ketones, UA 40 mg/dL (2+) (*)     Blood, UA Trace (*)     Protein, UA Trace (*)     All other components within normal limits   SEDIMENTATION RATE - Abnormal; Notable for the following components:    Sed Rate 40 (*)     All other components within normal limits   C-REACTIVE PROTEIN - Abnormal; Notable for the following components:    C-Reactive Protein 13.40 (*)     All other components within normal limits   CBC WITH AUTO DIFFERENTIAL - Abnormal; Notable for the following components:    WBC 19.10 (*)     Platelets 528 (*)     Neutrophil % 84.8 (*)     Lymphocyte % 9.6 (*)     Eosinophil % 0.2 (*)     Neutrophils, Absolute 16.20 (*)     Monocytes, Absolute 1.00 (*)     All other components within normal limits   OCCULT BLOOD, FECAL BY IMMUNOASSAY - Abnormal; Notable for the following components:    Occult Blood, Fecal by Immunoassay Positive (*)     All other components within normal limits   URINALYSIS, MICROSCOPIC ONLY - Abnormal; Notable for the following components:    RBC, UA 6-12 (*)     WBC, UA 0-2 (*)     All other components within normal limits   GASTROINTESTINAL PANEL, PCR - Normal    Narrative:     If Aeromonas, Staphylococcus aureus or Bacillus cereus are suspected, please order [id number]: Stool Culture, Aeromonas, S aureus, B Cereus.   LIPASE - Normal   FECAL LACTOFERRIN QUAL. - Normal   POC LACTATE - Normal   BLOOD CULTURE   BLOOD CULTURE   COVID PRE-OP / PRE-PROCEDURE SCREENING ORDER (NO ISOLATION)    Narrative:     The following orders were created for panel order COVID PRE-OP / PRE-PROCEDURE SCREENING ORDER (NO ISOLATION) - Swab, Nasopharynx.  Procedure                               Abnormality         Status                     ---------                                -----------         ------                     COVID-19,CEPHEID/BALA,CO...[[id number]]                                                   Please view results for these tests on the individual orders.   COVID-19,CEPHEID/BALA,COR/PEG/PAD/OZIEL IN-HOUSE,NP SWAB IN TRANSPORT MEDIA 3-4 HR TAT, RT-PCR   RAINBOW DRAW    Narrative:     The following orders were created for panel order [place] Draw.  Procedure                               Abnormality         Status                     ---------                               -----------         ------                     Green Top (Gel)[[id number]]                                  Final result               Lavender Top[[id number]]                                     Final result               Gold Top - SST[[id number]]                                   Final result               Light Blue Top[[id number]]                                                                Please view results for these tests on the individual orders.   POC LACTATE   CBC AND DIFFERENTIAL    Narrative:     The following orders were created for panel order CBC & Differential.  Procedure                               Abnormality         Status                     ---------                               -----------         ------                     CBC Auto Differential[[id number]]        Abnormal            Final result                 Please view results for these tests on the individual orders.   GREEN TOP   LAVENDER TOP   GOLD TOP - SST                                            MDM  Number of Diagnoses or Management Options  Colitis, acute  Fever, unspecified fever cause  LLQ abdominal pain  Melena  Diagnosis management comments: Chart Review: Nothing available for comparison.  Comorbidity: Ulcerative colitis, anxiety.  Imaging: Was interpreted by physician and reviewed by myself: CT Abdomen Pelvis With Contrast   Final Result         1. FINDINGS consistent with diffuse infectious/inflammatory colitis  of    the mid to distal transverse colon through the rectum. Similar    distribution of colon inflammation can be seen in cases of ulcerative    colitis. Correlate clinically. No evidence of bowel obstruction.    2. 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          Electronically Signed By-Joanne Mustafa MD On:8/8/2022 3:37 PM    This report was finalized on [id number] by  Joanne Mustafa MD.    Patient undressed and placed in gown for exam.  Appropriate PPE worn during patient exam.  Patient is alert and oriented x3.  She has been mild pain distress.  IV established and labs obtained.  Patient was given lactated Ringer's 1 L bolus, Dilaudid 1 mg IV, and Zofran 4 mg IV.  CT the abdomen pelvis with IV contrast obtained to the above findings. White blood cell count 19,100 with a left shift platelets 528 hemoglobin 14.5 hematocrit 43.0 CMP essentially unremarkable.  Urine shows no signs of infection.  GI panel negative.  Fecal occult positive.  CRP elevated at 13.4 sed rate elevated at 40.  Upon reassessment, patient reports relief with medications given, but remains feeling weak.  CT was significant for colitis, no abscess or perforation.  Patient was given Levaquin and Flagyl IV.  GI consult placed.  Dr. Schmidt's answering service was paged.  Spoke with Dr. Greer who accepted admission.    Disposition/Treatment: Discussed results with patient, verbalized understanding. Agreeable with plan of care.  Patient was stable upon admission.       Part of this note may be an electronic transcription/translation of spoken language to printed text using the Dragon Dictation System.            Amount and/or Complexity of Data Reviewed  Clinical lab tests: reviewed  Tests in the radiology section of CPT®: reviewed    Patient Progress  Patient progress: stable      Final diagnoses:   LLQ abdominal pain   Colitis, acute   Melena   Fever, unspecified fever cause       ED Disposition  ED  Disposition     ED Disposition   Decision to Admit    Condition   --    Comment   Level of Care: Med/Surg [1]   Admitting Physician: VICKIE MARSHALL [[id number]]   Attending Physician: VICKIE MARSHALL [[id number]]               No follow-up provider specified.       Medication List      No changes were made to your prescriptions during this visit.          Celi Miles RAYMUNDO, APRN  08/08/22 1647      Electronically signed by Cade Bell DO at 08/09/22 1343     Leilani Brooks, RN at 08/08/22 1303        Pt states she has a hx of UC. PT c/o abdominal pain, weakness, nausea, and multiple bowel movements a day. PT denies any cp, soa, or fevers.     Electronically signed by Leilani Brooks RN at 08/08/22 1304          Physician Progress Notes (all)      Kamlesh Wisemanbrenda FONSECA, MERISSA at 08/11/22 0859               LOS: 0 days   Patient Care Team:  Virgil Burrell MD as PCP - General (Family Medicine)  Kristy Ibarra MD as Consulting Physician (Gastroenterology)    Subjective     She continues with diarrhea and pain    Review of Systems   Constitutional: Positive for appetite change.   HENT: Negative.    Respiratory: Negative.    Cardiovascular: Negative.    Gastrointestinal: Positive for abdominal pain and diarrhea.   Genitourinary: Negative.    Musculoskeletal: Negative.    Neurological: Negative.    Psychiatric/Behavioral: Negative.            Objective     Vital Signs  Temp:  [98.5 °F (36.9 °C)-98.8 °F (37.1 °C)] 98.5 °F (36.9 °C)  Heart Rate:  [] 73  Resp:  [16-18] 17  BP: (114-150)/(65-92) 114/68      Physical Exam  Vitals reviewed.   Constitutional:       Appearance: She is not ill-appearing.   HENT:      Head: Normocephalic and atraumatic.      Right Ear: External ear normal.      Left Ear: External ear normal.      Nose: Nose normal.      Mouth/Throat:      Mouth: Mucous membranes are moist.   Eyes:      General:         Right eye: No discharge.         Left eye: No discharge.   Cardiovascular:      Rate and Rhythm: Normal  rate and regular rhythm.      Pulses: Normal pulses.      Heart sounds: Normal heart sounds.   Pulmonary:      Effort: Pulmonary effort is normal.      Breath sounds: Normal breath sounds.   Abdominal:      General: Bowel sounds are normal.      Palpations: Abdomen is soft.   Musculoskeletal:         General: Normal range of motion.      Cervical back: Normal range of motion.   Skin:     General: Skin is warm and dry.   Neurological:      Mental Status: She is alert and oriented to person, place, and time.   Psychiatric:         Behavior: Behavior normal.              Results Review:    Lab Results (last 24 hours)     Procedure Component Value Units Date/Time    Comprehensive Metabolic Panel [[id number]]  (Abnormal) Collected: 08/11/22 0126    Specimen: Blood Updated: 08/11/22 0246     Glucose 169 mg/dL      BUN 8 mg/dL      Creatinine 0.64 mg/dL      Sodium 138 mmol/L      Potassium 4.5 mmol/L      Comment: Slight hemolysis detected by analyzer. Results may be affected.        Chloride 104 mmol/L      CO2 26.0 mmol/L      Calcium 8.5 mg/dL      Total Protein 6.0 g/dL      Albumin 3.30 g/dL      ALT (SGPT) 10 U/L      AST (SGOT) 9 U/L      Alkaline Phosphatase 59 U/L      Total Bilirubin <0.2 mg/dL      Globulin 2.7 gm/dL      A/G Ratio 1.2 g/dL      BUN/Creatinine Ratio 12.5     Anion Gap 8.0 mmol/L      eGFR 114.0 mL/min/1.73      Comment: National Kidney Foundation and American Society of Nephrology (ASN) Task Force recommended calculation based on the Chronic Kidney Disease Epidemiology Collaboration (CKD-EPI) equation refit without adjustment for race.       Narrative:      GFR Normal >60  Chronic Kidney Disease <60  Kidney Failure <15      CBC & Differential [[id number]]  (Abnormal) Collected: 08/11/22 0126    Specimen: Blood Updated: 08/11/22 0222    Narrative:      The following orders were created for panel order CBC & Differential.  Procedure                               Abnormality         Status                      ---------                               -----------         ------                     CBC Auto Differential[[id number]]        Abnormal            Final result                 Please view results for these tests on the individual orders.    CBC Auto Differential [[id number]]  (Abnormal) Collected: 08/11/22 0126    Specimen: Blood Updated: 08/11/22 0222     WBC 22.70 10*3/mm3      RBC 3.96 10*6/mm3      Hemoglobin 11.0 g/dL      Hematocrit 34.7 %      MCV 87.7 fL      MCH 27.9 pg      MCHC 31.8 g/dL      RDW 12.8 %      RDW-SD 38.9 fl      MPV 7.4 fL      Platelets 474 10*3/mm3      Neutrophil % 86.0 %      Lymphocyte % 8.3 %      Monocyte % 5.6 %      Eosinophil % 0.0 %      Basophil % 0.1 %      Neutrophils, Absolute 19.50 10*3/mm3      Lymphocytes, Absolute 1.90 10*3/mm3      Monocytes, Absolute 1.30 10*3/mm3      Eosinophils, Absolute 0.00 10*3/mm3      Basophils, Absolute 0.00 10*3/mm3      nRBC 0.0 /100 WBC     Blood Culture - Blood, Arm, Right [[id number]]  (Normal) Collected: 08/08/22 1255    Specimen: Blood from Arm, Right Updated: 08/10/22 1315     Blood Culture No growth at 2 days    Blood Culture - Blood, Arm, Left [[id number]]  (Normal) Collected: 08/08/22 1244    Specimen: Blood from Arm, Left Updated: 08/10/22 1302     Blood Culture No growth at 2 days           Imaging Results (Last 24 Hours)     ** No results found for the last 24 hours. **               I reviewed the patient's new clinical results.    Medication Review:   Scheduled Meds:buPROPion XL, 150 mg, Oral, Daily  dicyclomine, 20 mg, Oral, 4x Daily  famotidine, 40 mg, Oral, Daily  lidocaine, 1 patch, Transdermal, Q24H  mesalamine, 2.4 g, Oral, BID  methylPREDNISolone sodium succinate, 20 mg, Intravenous, Q8H  metroNIDAZOLE, 500 mg, Intravenous, Q8H  sodium chloride, 3 mL, Intravenous, Q12H      Continuous Infusions:sodium chloride, 100 mL/hr, Last Rate: 100 mL/hr (08/11/22 9744)      PRN Meds:.•  acetaminophen  •  ALPRAZolam  •   "potassium chloride  •  potassium chloride  •  promethazine  •  sodium chloride  •  sodium chloride  •  traMADol     Interval History:    Assessment & Plan     LLQ abdominal pain  Ulcerative colitis  Leukocytosis  -antibiotic zosyn  -steroids  -GI following     Mood disorder-wellbutrin  Low back pain - CT abd with incidental finding of L5 spondylolytic defect with grade 1 anterolisthesis. Lidoderm patch . May need further outpt work up     Diet:regular  DVT prophylaxis:scd  GI prophylaxis:pepcid  Code status:full         Plan for disposition: [facility]     MERISSA Grimm  08/11/22  08:59 EDT          Electronically signed by Terri Wiseman APRN at 08/11/22 0902     Eri Stockton APRN at 08/11/22 0835           LOS: 0 days   Patient Care Team:  Virgil Burrell MD as PCP - General (Family Medicine)  Kristy Ibarra MD as Consulting Physician (Gastroenterology)      Subjective \" I am doing some better\"    Interval History:     Subjective: No nausea or vomiting and tolerating diet.  Less fatigue.  No rectal bleeding.  She continues to have abdominal cramping with fecal urgency and reports 8 bowel movements in the last 24 hours.      ROS:   No chest pain, shortness of breath, or cough.        Medication Review:     Current Facility-Administered Medications:   •  acetaminophen (TYLENOL) tablet 650 mg, 650 mg, Oral, Q4H PRN, Anamika Greer MD  •  ALPRAZolam (XANAX) tablet 0.25 mg, 0.25 mg, Oral, Nightly PRN, Terri Wiseman APRN, 0.25 mg at 08/10/22 2321  •  buPROPion XL (WELLBUTRIN XL) 24 hr tablet 150 mg, 150 mg, Oral, Daily, Terri Wiseman APRN, 150 mg at 08/10/22 0739  •  dicyclomine (BENTYL) capsule 20 mg, 20 mg, Oral, 4x Daily, Terri Wiseman APRN, 20 mg at 08/11/22 0745  •  famotidine (PEPCID) tablet 40 mg, 40 mg, Oral, Daily, Anamika Greer MD, 40 mg at 08/10/22 0739  •  lidocaine (LIDODERM) 5 % 1 patch, 1 patch, Transdermal, Q24H, Quin Ramsey APRN, 1 patch at 08/10/22 1205  •  mesalamine (LIALDA) EC " tablet 2.4 g, 2.4 g, Oral, BID, Eri Stockton APRN, 2.4 g at 08/10/22 2037  •  methylPREDNISolone sodium succinate (SOLU-Medrol) injection 20 mg, 20 mg, Intravenous, Q8H, Eri Stockton APRN, 20 mg at 08/11/22 0247  •  metroNIDAZOLE (FLAGYL) IVPB 500 mg, 500 mg, Intravenous, Q8H, Eri Stockton APRN, Stopped at 08/11/22 0317  •  potassium chloride (K-DUR,KLOR-CON) CR tablet 40 mEq, 40 mEq, Oral, PRN, Anamika Greer MD  •  potassium chloride (KLOR-CON) packet 40 mEq, 40 mEq, Oral, PRN, Anamika Greer MD  •  promethazine (PHENERGAN) tablet 25 mg, 25 mg, Oral, Q6H PRN, Anamika Greer MD, 25 mg at 08/11/22 0459  •  sodium chloride 0.9 % flush 10 mL, 10 mL, Intravenous, PRN, Anamika Greer MD  •  sodium chloride 0.9 % flush 3 mL, 3 mL, Intravenous, Q12H, Anamika Greer MD, 3 mL at 08/10/22 2036  •  sodium chloride 0.9 % flush 3-10 mL, 3-10 mL, Intravenous, PRN, Anamika Greer MD  •  sodium chloride 0.9 % infusion, 100 mL/hr, Intravenous, Continuous, Anamika Greer MD, Last Rate: 100 mL/hr at 08/11/22 0444, 100 mL/hr at 08/11/22 0444  •  traMADol (ULTRAM) tablet 50 mg, 50 mg, Oral, Q6H PRN, Eri Stockton APRN      Objective Resting in bed, no acute distress, no family present    Vital Signs  Vitals:    08/10/22 2032 08/10/22 2319 08/11/22 0442 08/11/22 0700   BP: 150/92 139/73 132/76 114/68   BP Location: Left arm Left arm Left arm Left arm   Patient Position: Lying Lying Lying Lying   Pulse: 100 97 100 73   Resp: 16 18 18 17   Temp: 98.7 °F (37.1 °C) 98.5 °F (36.9 °C) 98.6 °F (37 °C) 98.5 °F (36.9 °C)   TempSrc: Oral Oral Oral Oral   SpO2: 100% 97% 100% 97%   Weight:       Height:           Physical Exam:     General Appearance:    Awake and alert, in no acute distress   Head:    Normocephalic, without obvious abnormality   Eyes:          Conjunctivae normal, anicteric sclera   Throat:   No oral lesions, no thrush, oral mucosa moist   Neck:   supple, no JVD   Lungs:     respirations regular, even and unlabored       Rectal:      Deferred   Extremities:   No edema, no cyanosis   Skin:   No bruising or rash, no jaundice        Results Review:    CBC    Results from last 7 days   Lab Units 08/11/22  0126 08/10/22  0238 08/08/22  2346 08/08/22  1244   WBC 10*3/mm3 22.70* 17.60* 22.40* 19.10*   HEMOGLOBIN g/dL 11.0* 10.7* 12.6 14.5   PLATELETS 10*3/mm3 474* 472* 456* 528*     CMP   Results from last 7 days   Lab Units 08/11/22  0126 08/10/22  0238 08/08/22  2346 08/08/22  1244   SODIUM mmol/L 138 140 136 136   POTASSIUM mmol/L 4.5 4.0 4.2 4.4   CHLORIDE mmol/L 104 105 99 98   CO2 mmol/L 26.0 27.0 27.0 27.0   BUN mg/dL 8 7 6 6   CREATININE mg/dL 0.64 0.59 0.73 0.77   GLUCOSE mg/dL 169* 174* 104* 112*   ALBUMIN g/dL 3.30* 3.00*  --  4.00   BILIRUBIN mg/dL <0.2 <0.2  --  0.3   ALK PHOS U/L 59 77  --  91   AST (SGOT) U/L 9 6  --  13   ALT (SGPT) U/L 10 9  --  15   LIPASE U/L  --   --   --  14     Cr Clearance Estimated Creatinine Clearance: 103.7 mL/min (by C-G formula based on SCr of 0.64 mg/dL).  Coag     HbA1C No results found for: HGBA1C  Blood Glucose No results found for: POCGLU  Infection   Results from last 7 days   Lab Units 08/08/22  1255 08/08/22  1244   BLOODCX  No growth at 2 days No growth at 2 days     UA    Results from last 7 days   Lab Units 08/08/22  1306   NITRITE UA  Negative   WBC UA /HPF 0-2*   BACTERIA UA /HPF None Seen   SQUAM EPITHEL UA /HPF 0-2     Radiology(recent) No radiology results for the last day       Assessment & Plan   Ulcerative colitis with flare  Leukocytosis -likely secondary to above  Anxiety     PLAN:  Some improvement overnight but still having significant diarrhea, no further rectal bleeding.  Continue dicyclomine 4 times daily, IV steroids and mesalamine.  Continue IV Flagyl while inpatient.  Okay for diet as tolerated.  Discontinue narcotics as this increases mortality risk in inflammatory bowel disease patients.  Okay for tramadol as needed for pain.  Continue supportive care and hopefully home  tomorrow.  Discussed with bedside RN this morning on rounds.    Electronically signed by MERISSA Cm, 08/11/22, 8:35 AM EDT.           Electronically signed by Eri Stockton APRN at 08/11/22 0837     Quin Ramsey APRN at 08/10/22 1000     Attestation signed by Anamika Greer MD at 08/11/22 0721    I have reviewed this documentation and agree.                   LOS: 0 days   Patient Care Team:  Virgil Burrell MD as PCP - General (Family Medicine)  Kristy Ibarra MD as Consulting Physician (Gastroenterology)    Subjective     Interval History:    Patient Complaints:  Diarrhea improving .WBC down to 17 from 22    History taken from: patient    Review of Systems   Constitutional: Positive for activity change and appetite change. Negative for fever.   HENT: Negative for trouble swallowing.    Eyes: Negative for visual disturbance.   Respiratory: Negative for cough.    Cardiovascular: Negative for chest pain and palpitations.   Gastrointestinal: Positive for abdominal pain, diarrhea and nausea. Negative for blood in stool (no blod in stool today ) and vomiting.   Genitourinary: Negative for difficulty urinating.   Musculoskeletal: Positive for back pain (low back pain ).   Skin: Negative for pallor, rash and wound.   Neurological: Negative for headaches.   Hematological: Does not bruise/bleed easily.   Psychiatric/Behavioral: Negative for agitation and confusion.           Objective     Vital Signs  Temp:  [98.1 °F (36.7 °C)-98.8 °F (37.1 °C)] 98.8 °F (37.1 °C)  Heart Rate:  [] 94  Resp:  [15-24] 16  BP: (115-140)/(60-75) 130/65    Physical Exam:     General Appearance:    Alert, cooperative, in no acute distress,   Head:    Normocephalic, without obvious abnormality, atraumatic   Eyes:            Lids and lashes normal, conjunctivae and sclerae normal, no   icterus, no pallor, corneas clear, PERRLA   Ears:    Ears appear intact with no abnormalities noted   Throat:   No oral lesions, no thrush, oral  mucosa moist   Neck:   No adenopathy, supple, trachea midline, no thyromegaly, no   carotid bruit, no JVD   Lungs:     Clear to auscultation,respirations regular, even and                  unlabored    Heart:    Regular rhythm and normal rate, normal S1 and S2, no            murmur, no gallop, no rub, no click   Chest Wall:    No abnormalities observed   Abdomen:     Normal bowel sounds, no masses, no organomegaly, soft        Diffusely tender with palpation.  non-distended, no guarding,   Extremities:   Moves all extremities well, no edema, no cyanosis, no             Redness   Pulses:   Pulses palpable and equal bilaterally   Skin:   No bleeding, bruising or rash   Lymph nodes:   No palpable adenopathy   Neurologic:   Cranial nerves 2 - 12 grossly intact, sensation intact, DTR       present and equal bilaterally        Results Review:    Lab Results (last 24 hours)     Procedure Component Value Units Date/Time    Comprehensive Metabolic Panel [[id number]]  (Abnormal) Collected: 08/10/22 0238    Specimen: Blood Updated: 08/10/22 0356     Glucose 174 mg/dL      BUN 7 mg/dL      Creatinine 0.59 mg/dL      Sodium 140 mmol/L      Potassium 4.0 mmol/L      Chloride 105 mmol/L      CO2 27.0 mmol/L      Calcium 8.3 mg/dL      Total Protein 5.9 g/dL      Albumin 3.00 g/dL      ALT (SGPT) 9 U/L      AST (SGOT) 6 U/L      Alkaline Phosphatase 77 U/L      Total Bilirubin <0.2 mg/dL      Globulin 2.9 gm/dL      A/G Ratio 1.0 g/dL      BUN/Creatinine Ratio 11.9     Anion Gap 8.0 mmol/L      eGFR 116.3 mL/min/1.73      Comment: National Kidney Foundation and American Society of Nephrology (ASN) Task Force recommended calculation based on the Chronic Kidney Disease Epidemiology Collaboration (CKD-EPI) equation refit without adjustment for race.       Narrative:      GFR Normal >60  Chronic Kidney Disease <60  Kidney Failure <15      CBC & Differential [[id number]]  (Abnormal) Collected: 08/10/22 0238    Specimen: Blood Updated:  08/10/22 0313    Narrative:      The following orders were created for panel order CBC & Differential.  Procedure                               Abnormality         Status                     ---------                               -----------         ------                     CBC Auto Differential[[id number]]        Abnormal            Final result                 Please view results for these tests on the individual orders.    CBC Auto Differential [[id number]]  (Abnormal) Collected: 08/10/22 0238    Specimen: Blood Updated: 08/10/22 0313     WBC 17.60 10*3/mm3      RBC 3.84 10*6/mm3      Hemoglobin 10.7 g/dL      Hematocrit 33.5 %      MCV 87.2 fL      MCH 27.8 pg      MCHC 31.8 g/dL      RDW 12.8 %      RDW-SD 39.4 fl      MPV 7.3 fL      Platelets 472 10*3/mm3      Neutrophil % 83.2 %      Lymphocyte % 10.1 %      Monocyte % 6.5 %      Eosinophil % 0.0 %      Basophil % 0.2 %      Neutrophils, Absolute 14.60 10*3/mm3      Lymphocytes, Absolute 1.80 10*3/mm3      Monocytes, Absolute 1.10 10*3/mm3      Eosinophils, Absolute 0.00 10*3/mm3      Basophils, Absolute 0.00 10*3/mm3      nRBC 0.0 /100 WBC     Blood Culture - Blood, Arm, Right [[id number]]  (Normal) Collected: 08/08/22 1255    Specimen: Blood from Arm, Right Updated: 08/09/22 1318     Blood Culture No growth at 24 hours    Blood Culture - Blood, Arm, Left [[id number]]  (Normal) Collected: 08/08/22 1244    Specimen: Blood from Arm, Left Updated: 08/09/22 1304     Blood Culture No growth at 24 hours           Imaging Results (Last 24 Hours)     ** No results found for the last 24 hours. **               I reviewed the patient's new clinical results.    Medication Review:   Scheduled Meds:buPROPion XL, 150 mg, Oral, Daily  dicyclomine, 20 mg, Oral, 4x Daily  famotidine, 40 mg, Oral, Daily  lidocaine, 1 patch, Transdermal, Q24H  mesalamine, 2.4 g, Oral, BID  methylPREDNISolone sodium succinate, 20 mg, Intravenous, Q8H  metroNIDAZOLE, 500 mg, Intravenous,  Q8H  sodium chloride, 3 mL, Intravenous, Q12H      Continuous Infusions:sodium chloride, 100 mL/hr, Last Rate: 100 mL/hr (08/10/22 0738)      PRN Meds:.•  acetaminophen  •  ALPRAZolam  •  HYDROmorphone  •  potassium chloride  •  potassium chloride  •  promethazine  •  sodium chloride  •  sodium chloride     Assessment & Plan       LLQ abdominal pain    Ulcerative Colitis- zosyn, IV steroids, GI following. Diarrhea improved yet continues.     Mood D/O- continue wellbutrin .     Leukocytosis- improved today     Low back pain - CT abd with incidental finding of L5 spondylolytic defect with grade 1 anterolisthesis. Lidoderm patch . May need further outpt work up.     Plan for disposition: home possible tomorrow if diarrhea improved     MERISSA Banuelos  08/10/22  12:23 EDT          Electronically signed by Anamika Greer MD at 08/11/22 0721     Eri Stockton APRN at 08/10/22 0842     Attestation signed by Wayne Shay MD at 08/10/22 0901    I have reviewed this documentation and agree.  42-year-old woman with history of left sided ulcerative colitis admitted with abdominal pain diarrhea and rectal bleeding.  She reports having 10 bowel movements yesterday with liquid stool but she reports less rectal bleeding.  She denies fever.  Her abdomen is nondistended with good bowel sounds and she is minimally tender.  White blood count 17.6.  Hemoglobin 10.7.  Platelets 472.  BUN 7 creatinine 0.59.  We will continue diet as tolerated.  We will continue IV steroids and oral mesalamine.  Continue IV Flagyl until discharge.  Continue dicyclomine for abdominal cramping.  Possible discharge home tomorrow if her diarrhea has improved.  Plan for discharge on oral budesonide and mesalamine.  We will follow with you.  I saw the patient today with Eri Stockton NP.  I have performed a complete history and physical examination and reviewed appropriate laboratory studies and radiographic exams.  I have completed the majority  "and substantive portion of the history and physical examination.  I completed the majority and substantive portion of the medical decision making.    Wayne Shay M.D.                     LOS: 0 days   Patient Care Team:  Virgil Burrell MD as PCP - General (Family Medicine)  Kristy Ibarra MD as Consulting Physician (Gastroenterology)      Subjective \"I am ok\"    Interval History:     Subjective: No nausea or vomiting and appetite improved.  Tolerating diet without difficulty.  Intermittent abdominal cramping with diarrhea.  10 bowel movements yesterday with liquid stool this morning.  Less rectal bleeding.  No fevers reported.      ROS:   No chest pain, shortness of breath, or cough.        Medication Review:     Current Facility-Administered Medications:   •  acetaminophen (TYLENOL) tablet 650 mg, 650 mg, Oral, Q4H PRN, Anamika Greer MD  •  ALPRAZolam (XANAX) tablet 0.25 mg, 0.25 mg, Oral, Nightly PRN, Terri Wiseman APRN  •  buPROPion XL (WELLBUTRIN XL) 24 hr tablet 150 mg, 150 mg, Oral, Daily, Terri Wiseman APRN, 150 mg at 08/10/22 0739  •  dicyclomine (BENTYL) capsule 20 mg, 20 mg, Oral, 4x Daily, Terri Wiseman APRN, 20 mg at 08/10/22 0739  •  famotidine (PEPCID) tablet 40 mg, 40 mg, Oral, Daily, Anamika Greer MD, 40 mg at 08/10/22 0739  •  HYDROmorphone (DILAUDID) injection 0.5 mg, 0.5 mg, Intravenous, Q2H PRN, Anamika Greer MD, 0.5 mg at 08/10/22 0739  •  mesalamine (LIALDA) EC tablet 2.4 g, 2.4 g, Oral, BID, Eri Stockton APRN, 2.4 g at 08/10/22 0739  •  methylPREDNISolone sodium succinate (SOLU-Medrol) injection 20 mg, 20 mg, Intravenous, Q8H, Eri Stockton APRN, 20 mg at 08/10/22 0827  •  metroNIDAZOLE (FLAGYL) IVPB 500 mg, 500 mg, Intravenous, Q8H, Eri Stockton APRN, Last Rate: 0 mL/hr at 08/10/22 0738, 500 mg at 08/10/22 0827  •  potassium chloride (K-DUR,KLOR-CON) CR tablet 40 mEq, 40 mEq, Oral, PRN, Anamika Greer MD  •  potassium chloride (KLOR-CON) packet 40 mEq, 40 mEq, Oral, PRN, " Anamika Greer MD  •  promethazine (PHENERGAN) tablet 25 mg, 25 mg, Oral, Q6H PRN, Anamika Greer MD, 25 mg at 08/10/22 0753  •  sodium chloride 0.9 % flush 10 mL, 10 mL, Intravenous, PRN, Anamika Greer MD  •  sodium chloride 0.9 % flush 3 mL, 3 mL, Intravenous, Q12H, Anamika Greer MD, 3 mL at 08/10/22 0739  •  sodium chloride 0.9 % flush 3-10 mL, 3-10 mL, Intravenous, PRN, Anamika Greer MD  •  sodium chloride 0.9 % infusion, 100 mL/hr, Intravenous, Continuous, Anamika Greer MD, Last Rate: 100 mL/hr at 08/10/22 0738, 100 mL/hr at 08/10/22 0738      Objective Resting in bed, no acute distress, no family present    Vital Signs  Vitals:    08/09/22 2325 08/10/22 0300 08/10/22 0600 08/10/22 0659   BP: 115/70 115/60  126/71   BP Location: Right arm Right arm  Right arm   Patient Position: Lying Lying  Lying   Pulse: 96 92 68 82   Resp: 18 24  15   Temp: 98.6 °F (37 °C) 98.2 °F (36.8 °C)  98.4 °F (36.9 °C)   TempSrc: Oral Oral  Oral   SpO2: 96% 96% 96% 98%   Weight:       Height:           Physical Exam:     General Appearance:    Awake and alert, in no acute distress   Head:    Normocephalic, without obvious abnormality   Eyes:          Conjunctivae normal, anicteric sclera   Throat:   No oral lesions, no thrush, oral mucosa moist   Neck:   , supple, no JVD   Lungs:     respirations regular, even and unlabored       Rectal:     Deferred   Extremities:   No edema, no cyanosis   Skin:   No bruising or rash, no jaundice        Results Review:    CBC    Results from last 7 days   Lab Units 08/10/22  0238 08/08/22  2346 08/08/22  1244   WBC 10*3/mm3 17.60* 22.40* 19.10*   HEMOGLOBIN g/dL 10.7* 12.6 14.5   PLATELETS 10*3/mm3 472* 456* 528*     CMP   Results from last 7 days   Lab Units 08/10/22  0238 08/08/22  2346 08/08/22  1244   SODIUM mmol/L 140 136 136   POTASSIUM mmol/L 4.0 4.2 4.4   CHLORIDE mmol/L 105 99 98   CO2 mmol/L 27.0 27.0 27.0   BUN mg/dL 7 6 6   CREATININE mg/dL 0.59 0.73 0.77   GLUCOSE mg/dL 174* 104* 112*   ALBUMIN  g/dL 3.00*  --  4.00   BILIRUBIN mg/dL <0.2  --  0.3   ALK PHOS U/L 77  --  91   AST (SGOT) U/L 6  --  13   ALT (SGPT) U/L 9  --  15   LIPASE U/L  --   --  14     Cr Clearance Estimated Creatinine Clearance: 112.5 mL/min (by C-G formula based on SCr of 0.59 mg/dL).  Coag     HbA1C No results found for: HGBA1C  Blood Glucose No results found for: POCGLU  Infection   Results from last 7 days   Lab Units 08/08/22  1255 08/08/22  1244   BLOODCX  No growth at 24 hours No growth at 24 hours     UA    Results from last 7 days   Lab Units 08/08/22  1306   NITRITE UA  Negative   WBC UA /HPF 0-2*   BACTERIA UA /HPF None Seen   SQUAM EPITHEL UA /HPF 0-2     Radiology(recent) CT Abdomen Pelvis With Contrast    Result Date: 8/8/2022   1. FINDINGS consistent with diffuse infectious/inflammatory colitis of the mid to distal transverse colon through the rectum. Similar distribution of colon inflammation can be seen in cases of ulcerative colitis. Correlate clinically. No evidence of bowel obstruction. 2. 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    Electronically Signed By-Joanne Mustafa MD On:8/8/2022 3:37 PM This report was finalized on [id number] by  Joanne Mustafa MD.         Assessment & Plan   Ulcerative colitis with flare  Leukocytosis -likely secondary to above-improving  Anxiety     PLAN:  Stool studies negative for infection as outpatient with improvement of CRP from 50 to 13.  Improvement overnight on IV steroids and mesalamine.  Continue IV Flagyl while inpatient along with Bentyl for abdominal cramping.  Okay for diet as tolerated.  We will keep at least another day until diarrhea improves/manageable.  Continue supportive care and we will follow.    Electronically signed by MERISSA Cm, 08/10/22, 8:42 AM EDT.           Electronically signed by Wayne Shay MD at 08/10/22 0901          Consult Notes (all)      Eri Stockton APRN at 08/09/22 0932      Consult  "Orders    1. Gastroenterology (on-call MD unless specified) [[id number]] ordered by Anamika Greer MD at 08/08/22 1618          Attestation signed by Wayne Shay MD at 08/09/22 1115    I have reviewed this documentation and agree.  42-year-old woman with history of ulcerative colitis diagnosed in 2009.  In the recent past she was taking no medications.  She had increasing symptoms of diarrhea and she was treated with budesonide without improvement.  Recent labs showed CRP 50, fecal calprotectin 908, and white blood count 23,000.  She was therefore admitted for further evaluation.  On examination her abdomen is nondistended with good bowel sounds.  She is diffusely mildly tender.  White blood count 22.4.  Hemoglobin 12.6.  CT scan shows diffuse colitis extending from the transverse colon to the rectum.  I am recommending IV Solu-Medrol, IV Flagyl, oral mesalamine, and dicyclomine.  There is no indication for Zosyn.  Continue IV fluids.  Diet as tolerated.  Possible discharge home tomorrow on oral budesonide and mesalamine.  We will follow with you.  I saw the patient today with Eri Stockton NP.  I have performed a complete history and physical examination and reviewed appropriate laboratory studies and radiographic exams.  I have completed the majority and substantive portion of the history and physical examination.  I completed the majority and substantive portion of the medical decision making.    Wayne Shay M.D.                    GI CONSULT  NOTE:    Referring Provider:  Dr. Greer    Chief complaint: Colitis    Subjective . \"I was having worsening pain and diarrhea\"    History of present illness: Sanam Tijerina is a 42 y.o. female who has a history of left-sided ulcerative colitis, diagnosed in 2009, C. difficile infection and anxiety.  She presents with a 2-week history of abdominal pain, diarrhea, fatigue, nausea and anorexia.  She tried managing at home with turmeric and diet changes without " significant relief.  Previously managed on Lialda.  She was started on budesonide recently without significant change in symptoms.  Outpatient labs showed CRP 50, WBC 23, fecal calprotectin 908 and C. difficile/stool culture unremarkable.  She is having lower abdominal pain, weakness and fatigue along with diarrhea and fecal urgency.  She is having more than 10 bowel movements per day but denies melena or rectal bleeding.  Nausea but no vomiting.  No fevers or chills.      Endo History:  4/2019 colonoscopy by Dr. Ibarra -hemorrhoids, hyperplastic polyp, benign right and left colon biopsies    Past Medical History:  Past Medical History:   Diagnosis Date   • Ulcerative colitis (HCC)    C. difficile    Past Surgical History:  Past Surgical History:   Procedure Laterality Date   • HYSTERECTOMY     Colonoscopy    Social History:  Social History     Tobacco Use   • Smoking status: Never Smoker   • Smokeless tobacco: Never Used   Vaping Use   • Vaping Use: Never used   Substance Use Topics   • Alcohol use: Never   • Drug use: Never   None    Family History:  No family history of colon cancer    Medications:  Medications Prior to Admission   Medication Sig Dispense Refill Last Dose   • ALPRAZolam (XANAX) 0.25 MG tablet Take 0.25 mg by mouth At Night As Needed for Anxiety.      • Budesonide (ENTOCORT EC) 3 MG 24 hr capsule Take 9 mg by mouth Every Morning.      • buPROPion XL (WELLBUTRIN XL) 150 MG 24 hr tablet Take 150 mg by mouth Daily.      • dicyclomine (BENTYL) 20 MG tablet Take 20 mg by mouth Every 6 (Six) Hours.      • mesalamine (ROWASA) 4 g enema Insert 1 g into the rectum Every Night. Suppository          Scheduled Meds:buPROPion XL, 150 mg, Oral, Daily  dicyclomine, 20 mg, Oral, 4x Daily  famotidine, 40 mg, Oral, Daily  mesalamine, 2.4 g, Oral, BID  methylPREDNISolone sodium succinate, 20 mg, Intravenous, Q8H  metroNIDAZOLE, 500 mg, Intravenous, Q8H  sodium chloride, 3 mL, Intravenous, Q12H      Continuous  Infusions:sodium chloride, 100 mL/hr, Last Rate: 100 mL/hr (08/09/22 0845)      PRN Meds:.•  acetaminophen  •  ALPRAZolam  •  HYDROmorphone  •  potassium chloride  •  potassium chloride  •  promethazine  •  sodium chloride  •  sodium chloride    ALLERGIES:  Flagyl [metronidazole]    ROS:  The following systems were reviewed   Constitution:  No fevers, chills, no unintentional weight loss  Skin: no rash, no jaundice  Eyes:  No blurry vision, no eye pain  HENT:  No change in hearing or smell  Resp:  No dyspnea or cough  CV:  No chest pain or palpitations  :  No dysuria, hematuria  Musculoskeletal:  No leg cramps or arthralgias  Neuro:  No tremor, no numbness  Psych:  No depression or confusion    Objective Resting in bed, no acute distress, no family present    Vital Signs:   Vitals:    08/08/22 1941 08/09/22 0040 08/09/22 0337 08/09/22 0755   BP: 125/85 117/68 115/66 121/80   BP Location: Right arm Right arm Right arm Right arm   Patient Position: Lying Lying Lying Lying   Pulse: 95 87 73 97   Resp: 17 14 14 15   Temp: 98.5 °F (36.9 °C) 98.5 °F (36.9 °C) 98 °F (36.7 °C) 98.1 °F (36.7 °C)   TempSrc: Oral Oral Oral Oral   SpO2: 100% 98% 98% 98%   Weight: 63.4 kg (139 lb 12.4 oz)      Height:           Physical Exam:       General Appearance:    Awake and alert, in no acute distress   Head:    Normocephalic, without obvious abnormality, atraumatic   Throat:   No oral lesions, no thrush, oral mucosa moist   Lungs:     Respirations regular, even and unlabored   Chest Wall:    No abnormalities observed       Rectal:     Deferred   Extremities:   Moves all extremities, no edema, no cyanosis   Pulses:   Pulses palpable and equal bilaterally   Skin:   No rash, no jaundice, normal palpation       Neurologic:   Cranial nerves 2 - 12 grossly intact       Results Review:   I reviewed the patient's labs and imaging.  CBC    Results from last 7 days   Lab Units 08/08/22  2346 08/08/22  1244   WBC 10*3/mm3 22.40* 19.10*    HEMOGLOBIN g/dL 12.6 14.5   PLATELETS 10*3/mm3 456* 528*     CMP   Results from last 7 days   Lab Units 08/08/22  2346 08/08/22  1244   SODIUM mmol/L 136 136   POTASSIUM mmol/L 4.2 4.4   CHLORIDE mmol/L 99 98   CO2 mmol/L 27.0 27.0   BUN mg/dL 6 6   CREATININE mg/dL 0.73 0.77   GLUCOSE mg/dL 104* 112*   ALBUMIN g/dL  --  4.00   BILIRUBIN mg/dL  --  0.3   ALK PHOS U/L  --  91   AST (SGOT) U/L  --  13   ALT (SGPT) U/L  --  15   LIPASE U/L  --  14     Cr Clearance Estimated Creatinine Clearance: 90.9 mL/min (by C-G formula based on SCr of 0.73 mg/dL).  Coag     HbA1C No results found for: HGBA1C  Blood Glucose No results found for: POCGLU  Infection     UA    Results from last 7 days   Lab Units 08/08/22  1306   NITRITE UA  Negative   WBC UA /HPF 0-2*   BACTERIA UA /HPF None Seen   SQUAM EPITHEL UA /HPF 0-2     Radiology(recent) CT Abdomen Pelvis With Contrast    Result Date: 8/8/2022   1. FINDINGS consistent with diffuse infectious/inflammatory colitis of the mid to distal transverse colon through the rectum. Similar distribution of colon inflammation can be seen in cases of ulcerative colitis. Correlate clinically. No evidence of bowel obstruction. 2. 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    Electronically Signed By-Joanne Mustafa MD On:8/8/2022 3:37 PM This report was finalized on [id number] by  Joanne Mustafa MD.         ASSESSMENT:  Ulcerative colitis with flare  Leukocytosis -likely secondary to above  Anxiety    PLAN:  Patient presents with a 2-week history of abdominal pain, diarrhea, nausea with anorexia and fatigue.  Outpatient stool studies show negative culture and C. Difficile.  Outpatient CRP 50 with fecal calprotectin 908 with likely ulcerative colitis flare.  No relief of symptoms with 5-day history of budesonide, although improvement of CRP.  Start mesalamine, IV Solu-Medrol, IV Flagyl and dicyclomine.  Discontinue Zosyn.  IV fluids for hydration and okay for  diet as tolerated.  Monitor response and hopefully home soon.  Continue supportive care we will follow.    I discussed the patients findings and my recommendations with the patient.    We appreciate the referral    Electronically signed by MERISSA Cm, 08/09/22, 9:32 AM EDT.                Electronically signed by Wayne Shay MD at 08/09/22 3929

## 2022-08-12 ENCOUNTER — INPATIENT HOSPITAL (AMBULATORY)
Dept: URBAN - METROPOLITAN AREA HOSPITAL 84 | Facility: HOSPITAL | Age: 42
End: 2022-08-12

## 2022-08-12 VITALS
DIASTOLIC BLOOD PRESSURE: 81 MMHG | HEIGHT: 63 IN | RESPIRATION RATE: 17 BRPM | WEIGHT: 139.77 LBS | HEART RATE: 82 BPM | BODY MASS INDEX: 24.77 KG/M2 | OXYGEN SATURATION: 97 % | TEMPERATURE: 98.6 F | SYSTOLIC BLOOD PRESSURE: 131 MMHG

## 2022-08-12 DIAGNOSIS — R19.7 DIARRHEA, UNSPECIFIED: ICD-10-CM

## 2022-08-12 DIAGNOSIS — K51.911 ULCERATIVE COLITIS, UNSPECIFIED WITH RECTAL BLEEDING: ICD-10-CM

## 2022-08-12 DIAGNOSIS — D72.829 ELEVATED WHITE BLOOD CELL COUNT, UNSPECIFIED: ICD-10-CM

## 2022-08-12 PROBLEM — F41.9 ANXIETY: Status: ACTIVE | Noted: 2022-08-12

## 2022-08-12 PROBLEM — K51.90 ULCERATIVE COLITIS: Status: ACTIVE | Noted: 2022-08-12

## 2022-08-12 LAB
ALBUMIN SERPL-MCNC: 3.1 G/DL (ref 3.5–5.2)
ALBUMIN/GLOB SERPL: 1 G/DL
ALP SERPL-CCNC: 64 U/L (ref 39–117)
ALT SERPL W P-5'-P-CCNC: 14 U/L (ref 1–33)
ANION GAP SERPL CALCULATED.3IONS-SCNC: 9 MMOL/L (ref 5–15)
AST SERPL-CCNC: 12 U/L (ref 1–32)
BILIRUB SERPL-MCNC: <0.2 MG/DL (ref 0–1.2)
BUN SERPL-MCNC: 9 MG/DL (ref 6–20)
BUN/CREAT SERPL: 16.1 (ref 7–25)
CALCIUM SPEC-SCNC: 8.1 MG/DL (ref 8.6–10.5)
CHLORIDE SERPL-SCNC: 102 MMOL/L (ref 98–107)
CO2 SERPL-SCNC: 26 MMOL/L (ref 22–29)
CREAT SERPL-MCNC: 0.56 MG/DL (ref 0.57–1)
CRP SERPL-MCNC: 1.14 MG/DL (ref 0–0.5)
DEPRECATED RDW RBC AUTO: 41.1 FL (ref 37–54)
EGFRCR SERPLBLD CKD-EPI 2021: 117.8 ML/MIN/1.73
EOSINOPHIL # BLD MANUAL: 0.19 10*3/MM3 (ref 0–0.4)
EOSINOPHIL NFR BLD MANUAL: 1 % (ref 0.3–6.2)
ERYTHROCYTE [DISTWIDTH] IN BLOOD BY AUTOMATED COUNT: 13.3 % (ref 12.3–15.4)
ERYTHROCYTE [SEDIMENTATION RATE] IN BLOOD: 18 MM/HR (ref 0–20)
GLOBULIN UR ELPH-MCNC: 3 GM/DL
GLUCOSE SERPL-MCNC: 118 MG/DL (ref 65–99)
HCT VFR BLD AUTO: 36.8 % (ref 34–46.6)
HGB BLD-MCNC: 11.8 G/DL (ref 12–15.9)
LYMPHOCYTES # BLD MANUAL: 3.69 10*3/MM3 (ref 0.7–3.1)
LYMPHOCYTES NFR BLD MANUAL: 10 % (ref 5–12)
MCH RBC QN AUTO: 28.2 PG (ref 26.6–33)
MCHC RBC AUTO-ENTMCNC: 32.2 G/DL (ref 31.5–35.7)
MCV RBC AUTO: 87.7 FL (ref 79–97)
METAMYELOCYTES NFR BLD MANUAL: 1 % (ref 0–0)
MONOCYTES # BLD: 1.94 10*3/MM3 (ref 0.1–0.9)
NEUTROPHILS # BLD AUTO: 13.39 10*3/MM3 (ref 1.7–7)
NEUTROPHILS NFR BLD MANUAL: 65 % (ref 42.7–76)
NEUTS BAND NFR BLD MANUAL: 4 % (ref 0–5)
PLATELET # BLD AUTO: 478 10*3/MM3 (ref 140–450)
PMV BLD AUTO: 7.3 FL (ref 6–12)
POTASSIUM SERPL-SCNC: 4.1 MMOL/L (ref 3.5–5.2)
PROT SERPL-MCNC: 6.1 G/DL (ref 6–8.5)
RBC # BLD AUTO: 4.2 10*6/MM3 (ref 3.77–5.28)
RBC MORPH BLD: NORMAL
SCAN SLIDE: NORMAL
SMALL PLATELETS BLD QL SMEAR: ABNORMAL
SODIUM SERPL-SCNC: 137 MMOL/L (ref 136–145)
VARIANT LYMPHS NFR BLD MANUAL: 17 % (ref 19.6–45.3)
VARIANT LYMPHS NFR BLD MANUAL: 2 % (ref 0–5)
WBC MORPH BLD: NORMAL
WBC NRBC COR # BLD: 19.4 10*3/MM3 (ref 3.4–10.8)

## 2022-08-12 PROCEDURE — 80053 COMPREHEN METABOLIC PANEL: CPT | Performed by: NURSE PRACTITIONER

## 2022-08-12 PROCEDURE — 99232 SBSQ HOSP IP/OBS MODERATE 35: CPT | Performed by: NURSE PRACTITIONER

## 2022-08-12 PROCEDURE — 85007 BL SMEAR W/DIFF WBC COUNT: CPT | Performed by: INTERNAL MEDICINE

## 2022-08-12 PROCEDURE — 85652 RBC SED RATE AUTOMATED: CPT | Performed by: NURSE PRACTITIONER

## 2022-08-12 PROCEDURE — 25010000002 METHYLPREDNISOLONE PER 40 MG: Performed by: NURSE PRACTITIONER

## 2022-08-12 PROCEDURE — 86140 C-REACTIVE PROTEIN: CPT | Performed by: NURSE PRACTITIONER

## 2022-08-12 PROCEDURE — 85025 COMPLETE CBC W/AUTO DIFF WBC: CPT | Performed by: INTERNAL MEDICINE

## 2022-08-12 RX ORDER — TRAMADOL HYDROCHLORIDE 50 MG/1
50 TABLET ORAL EVERY 6 HOURS PRN
Qty: 10 TABLET | Refills: 0 | Status: SHIPPED | OUTPATIENT
Start: 2022-08-12 | End: 2022-08-18

## 2022-08-12 RX ORDER — TRAMADOL HYDROCHLORIDE 50 MG/1
50 TABLET ORAL EVERY 6 HOURS PRN
Qty: 24 TABLET | Refills: 0 | Status: SHIPPED | OUTPATIENT
Start: 2022-08-12 | End: 2022-08-12 | Stop reason: SDUPTHER

## 2022-08-12 RX ORDER — MESALAMINE 1.2 G/1
2.4 TABLET, DELAYED RELEASE ORAL 2 TIMES DAILY
Qty: 60 TABLET | Refills: 1 | Status: SHIPPED | OUTPATIENT
Start: 2022-08-12

## 2022-08-12 RX ORDER — LIDOCAINE 50 MG/G
1 PATCH TOPICAL
Qty: 30 PATCH | Refills: 1 | Status: SHIPPED | OUTPATIENT
Start: 2022-08-13

## 2022-08-12 RX ADMIN — Medication 3 ML: at 09:58

## 2022-08-12 RX ADMIN — METHYLPREDNISOLONE SODIUM SUCCINATE 20 MG: 40 INJECTION, POWDER, FOR SOLUTION INTRAMUSCULAR; INTRAVENOUS at 03:46

## 2022-08-12 RX ADMIN — METRONIDAZOLE 500 MG: 500 INJECTION, SOLUTION INTRAVENOUS at 03:46

## 2022-08-12 RX ADMIN — BUPROPION HYDROCHLORIDE 150 MG: 150 TABLET, EXTENDED RELEASE ORAL at 09:59

## 2022-08-12 RX ADMIN — METHYLPREDNISOLONE SODIUM SUCCINATE 20 MG: 40 INJECTION, POWDER, FOR SOLUTION INTRAMUSCULAR; INTRAVENOUS at 09:58

## 2022-08-12 RX ADMIN — DICYCLOMINE HYDROCHLORIDE 20 MG: 10 CAPSULE ORAL at 09:59

## 2022-08-12 RX ADMIN — LIDOCAINE 1 PATCH: 50 PATCH CUTANEOUS at 09:58

## 2022-08-12 RX ADMIN — MESALAMINE 2.4 G: 1.2 TABLET, DELAYED RELEASE ORAL at 09:59

## 2022-08-12 RX ADMIN — FAMOTIDINE 40 MG: 20 TABLET ORAL at 09:58

## 2022-08-12 NOTE — DISCHARGE SUMMARY
Date of Discharge:  8/12/2022    Discharge Diagnosis:   UC- improving - will FU with GI - pt ot take budesonide 9 mg qd ( she has this at home) she will continue dicyclomine for cramping. She is also going home with lialda . She has been taking tramadol here for pain . I am sending her home with # 10 and she is being asked to take these very sparingly .        Presenting Problem/History of Present Illness  Active Hospital Problems    Diagnosis  POA   • **LLQ abdominal pain [R10.32]  Yes   • Ulcerative colitis (HCC) [K51.90]  Yes   • Leukocytosis [D72.829]  Yes   • Anxiety [F41.9]  Yes      Resolved Hospital Problems   No resolved problems to display.          Hospital Course  Patient is a 42 y.o. female with hx UC presented with LLQ pain and bloody diarrhea . ER found elevated WBC at 19.1, CT abd/pelvis with diffuse infectious/ inflammatory colitis. She was started o IV abx and steroids in the ER and admitted. GI was consulted. Pt improved with treatment. Today, her bloody stool is resolved and diarrhea is much improved. She has slight pain and this is mch improved as well. She will dc on po lialda, budesonide and she will FU with GI .     Procedures Performed         Consults:   Consults     Date and Time Order Name Status Description    8/9/2022 12:33 AM Inpatient Gastroenterology Consult      8/9/2022 12:33 AM Gastroenterology (on-call MD unless specified) Completed     8/8/2022  4:19 PM Family Medicine Consult            Pertinent Test Results:    Lab Results (most recent)     Procedure Component Value Units Date/Time    C-reactive Protein [[id number]]  (Abnormal) Collected: 08/12/22 0446    Specimen: Blood Updated: 08/12/22 0926     C-Reactive Protein 1.14 mg/dL     Comprehensive Metabolic Panel [[id number]]  (Abnormal) Collected: 08/12/22 0446    Specimen: Blood Updated: 08/12/22 0653     Glucose 118 mg/dL      BUN 9 mg/dL      Creatinine 0.56 mg/dL      Sodium 137 mmol/L      Potassium 4.1 mmol/L      Chloride  102 mmol/L      CO2 26.0 mmol/L      Calcium 8.1 mg/dL      Total Protein 6.1 g/dL      Albumin 3.10 g/dL      ALT (SGPT) 14 U/L      AST (SGOT) 12 U/L      Alkaline Phosphatase 64 U/L      Total Bilirubin <0.2 mg/dL      Globulin 3.0 gm/dL      A/G Ratio 1.0 g/dL      BUN/Creatinine Ratio 16.1     Anion Gap 9.0 mmol/L      eGFR 117.8 mL/min/1.73      Comment: National Kidney Foundation and American Society of Nephrology (ASN) Task Force recommended calculation based on the Chronic Kidney Disease Epidemiology Collaboration (CKD-EPI) equation refit without adjustment for race.       Narrative:      GFR Normal >60  Chronic Kidney Disease <60  Kidney Failure <15      Manual Differential [[id number]]  (Abnormal) Collected: 08/12/22 0446    Specimen: Blood Updated: 08/12/22 0648     Neutrophil % 65.0 %      Lymphocyte % 17.0 %      Monocyte % 10.0 %      Eosinophil % 1.0 %      Bands %  4.0 %      Metamyelocyte % 1.0 %      Atypical Lymphocyte % 2.0 %      Neutrophils Absolute 13.39 10*3/mm3      Lymphocytes Absolute 3.69 10*3/mm3      Monocytes Absolute 1.94 10*3/mm3      Eosinophils Absolute 0.19 10*3/mm3      RBC Morphology Normal     WBC Morphology Normal     Platelet Estimate Increased    CBC & Differential [[id number]]  (Abnormal) Collected: 08/12/22 0446    Specimen: Blood Updated: 08/12/22 0648    Narrative:      The following orders were created for panel order CBC & Differential.  Procedure                               Abnormality         Status                     ---------                               -----------         ------                     CBC Auto Differential[[id number]]        Abnormal            Final result               Scan Slide[[id number]]                                       Final result                 Please view results for these tests on the individual orders.    Scan Slide [[id number]] Collected: 08/12/22 0446    Specimen: Blood Updated: 08/12/22 0648     Scan Slide --     Comment: See  Manual Differential Results       CBC Auto Differential [[id number]]  (Abnormal) Collected: 08/12/22 0446    Specimen: Blood Updated: 08/12/22 0648     WBC 19.40 10*3/mm3      RBC 4.20 10*6/mm3      Hemoglobin 11.8 g/dL      Hematocrit 36.8 %      MCV 87.7 fL      MCH 28.2 pg      MCHC 32.2 g/dL      RDW 13.3 %      RDW-SD 41.1 fl      MPV 7.3 fL      Platelets 478 10*3/mm3     Narrative:      Modified report. Previous result was Hemogram on 8/12/2022 at 0615 EDT.  The previously reported component NRBC is no longer being reported. Previous result was 0.0 /100 WBC (Reference Range: 0.0-0.2 /100 WBC) on 8/12/2022 at 0615 EDT.    Sedimentation Rate [[id number]]  (Normal) Collected: 08/12/22 0446    Specimen: Blood Updated: 08/12/22 0624     Sed Rate 18 mm/hr     Blood Culture - Blood, Arm, Right [[id number]]  (Normal) Collected: 08/08/22 1255    Specimen: Blood from Arm, Right Updated: 08/11/22 1316     Blood Culture No growth at 3 days    Blood Culture - Blood, Arm, Left [[id number]]  (Normal) Collected: 08/08/22 1244    Specimen: Blood from Arm, Left Updated: 08/11/22 1303     Blood Culture No growth at 3 days    Comprehensive Metabolic Panel [[id number]]  (Abnormal) Collected: 08/11/22 0126    Specimen: Blood Updated: 08/11/22 0246     Glucose 169 mg/dL      BUN 8 mg/dL      Creatinine 0.64 mg/dL      Sodium 138 mmol/L      Potassium 4.5 mmol/L      Comment: Slight hemolysis detected by analyzer. Results may be affected.        Chloride 104 mmol/L      CO2 26.0 mmol/L      Calcium 8.5 mg/dL      Total Protein 6.0 g/dL      Albumin 3.30 g/dL      ALT (SGPT) 10 U/L      AST (SGOT) 9 U/L      Alkaline Phosphatase 59 U/L      Total Bilirubin <0.2 mg/dL      Globulin 2.7 gm/dL      A/G Ratio 1.2 g/dL      BUN/Creatinine Ratio 12.5     Anion Gap 8.0 mmol/L      eGFR 114.0 mL/min/1.73      Comment: National Kidney Foundation and American Society of Nephrology (ASN) Task Force recommended calculation based on the Chronic Kidney  Disease Epidemiology Collaboration (CKD-EPI) equation refit without adjustment for race.       Narrative:      GFR Normal >60  Chronic Kidney Disease <60  Kidney Failure <15      CBC & Differential [[id number]]  (Abnormal) Collected: 08/11/22 0126    Specimen: Blood Updated: 08/11/22 0222    Narrative:      The following orders were created for panel order CBC & Differential.  Procedure                               Abnormality         Status                     ---------                               -----------         ------                     CBC Auto Differential[[id number]]        Abnormal            Final result                 Please view results for these tests on the individual orders.    CBC Auto Differential [[id number]]  (Abnormal) Collected: 08/11/22 0126    Specimen: Blood Updated: 08/11/22 0222     WBC 22.70 10*3/mm3      RBC 3.96 10*6/mm3      Hemoglobin 11.0 g/dL      Hematocrit 34.7 %      MCV 87.7 fL      MCH 27.9 pg      MCHC 31.8 g/dL      RDW 12.8 %      RDW-SD 38.9 fl      MPV 7.4 fL      Platelets 474 10*3/mm3      Neutrophil % 86.0 %      Lymphocyte % 8.3 %      Monocyte % 5.6 %      Eosinophil % 0.0 %      Basophil % 0.1 %      Neutrophils, Absolute 19.50 10*3/mm3      Lymphocytes, Absolute 1.90 10*3/mm3      Monocytes, Absolute 1.30 10*3/mm3      Eosinophils, Absolute 0.00 10*3/mm3      Basophils, Absolute 0.00 10*3/mm3      nRBC 0.0 /100 WBC     Basic Metabolic Panel [[id number]]  (Abnormal) Collected: 08/08/22 2346    Specimen: Blood Updated: 08/09/22 0103     Glucose 104 mg/dL      BUN 6 mg/dL      Creatinine 0.73 mg/dL      Sodium 136 mmol/L      Potassium 4.2 mmol/L      Chloride 99 mmol/L      CO2 27.0 mmol/L      Calcium 8.5 mg/dL      BUN/Creatinine Ratio 8.2     Anion Gap 10.0 mmol/L      eGFR 106.1 mL/min/1.73      Comment: National Kidney Foundation and American Society of Nephrology (ASN) Task Force recommended calculation based on the Chronic Kidney Disease Epidemiology  Collaboration (CKD-EPI) equation refit without adjustment for race.       Narrative:      GFR Normal >60  Chronic Kidney Disease <60  Kidney Failure <15      COVID PRE-OP / PRE-PROCEDURE SCREENING ORDER (NO ISOLATION) - Swab, Nasopharynx [[id number]]  (Normal) Collected: 08/08/22 1733    Specimen: Swab from Nasopharynx Updated: 08/08/22 1827    Narrative:      The following orders were created for panel order COVID PRE-OP / PRE-PROCEDURE SCREENING ORDER (NO ISOLATION) - Swab, Nasopharynx.  Procedure                               Abnormality         Status                     ---------                               -----------         ------                     COVID-19,CEPHEID/BALA,CO...[[id number]]  Normal              Final result                 Please view results for these tests on the individual orders.    COVID-19,CEPHEID/BALA,COR/PEG/PAD/OZIEL IN-HOUSE(OR EMERGENT/ADD-ON),NP SWAB IN TRANSPORT MEDIA 3-4 HR TAT, RT-PCR - Swab, Nasopharynx [[id number]]  (Normal) Collected: 08/08/22 1733    Specimen: Swab from Nasopharynx Updated: 08/08/22 1827     COVID19 Not Detected    Narrative:      Fact sheet for providers: https://www.fda.gov/media/616846/download     Fact sheet for patients: https://www.fda.gov/media/044918/download  Fact sheet for providers: https://www.fda.gov/media/033581/download     Fact sheet for patients: https://www.fda.gov/media/572785/download    Gastrointestinal Panel, PCR - Stool, Per Rectum [[id number]]  (Normal) Collected: 08/08/22 1258    Specimen: Stool from Per Rectum Updated: 08/08/22 1434     Campylobacter Not Detected     Plesiomonas shigelloides Not Detected     Salmonella Not Detected     Vibrio Not Detected     Vibrio cholerae Not Detected     Yersinia enterocolitica Not Detected     Enteroaggregative E. coli (EAEC) Not Detected     Enteropathogenic E. coli (EPEC) Not Detected     Enterotoxigenic E. coli (ETEC) lt/st Not Detected     Shiga-like toxin-producing E. coli (STEC) stx1/stx2  Not Detected     Shigella/Enteroinvasive E. coli (EIEC) Not Detected     Cryptosporidium Not Detected     Cyclospora cayetanensis Not Detected     Entamoeba histolytica Not Detected     Giardia lamblia Not Detected     Adenovirus F40/41 Not Detected     Astrovirus Not Detected     Norovirus GI/GII Not Detected     Rotavirus A Not Detected     Sapovirus (I, II, IV or V) Not Detected    Narrative:      If Aeromonas, Staphylococcus aureus or Bacillus cereus are suspected, please order [id number]: Stool Culture, Aeromonas, S aureus, B Cereus.    [place] Draw [[id number]] Collected: 08/08/22 1244    Specimen: Blood Updated: 08/08/22 1348    Narrative:      The following orders were created for panel order [place] Draw.  Procedure                               Abnormality         Status                     ---------                               -----------         ------                     Green Top (Gel)[[id number]]                                  Final result               Lavender Top[[id number]]                                     Final result               Gold Top - SST[[id number]]                                   Final result               Light Blue Top[[id number]]                                                                Please view results for these tests on the individual orders.    Lavender Top [[id number]] Collected: 08/08/22 1244    Specimen: Blood Updated: 08/08/22 1348     Extra Tube hold for add-on     Comment: Auto resulted       Gold Top - SST [[id number]] Collected: 08/08/22 1244    Specimen: Blood Updated: 08/08/22 1348     Extra Tube Hold for add-ons.     Comment: Auto resulted.       Fecal Lactoferrin Qual. - Stool, Per Rectum [[id number]]  (Normal) Collected: 08/08/22 1258    Specimen: Stool from Per Rectum Updated: 08/08/22 1324     Lactoferrin, Qual Negative    Urinalysis With Microscopic If Indicated (No Culture) - Urine, Clean Catch [[id number]]  (Abnormal) Collected: 08/08/22 1306    Specimen:  Urine, Clean Catch Updated: 08/08/22 1320     Color, UA Yellow     Appearance, UA Clear     pH, UA 7.5     Specific Gravity, UA 1.010     Glucose, UA Negative     Ketones, UA 40 mg/dL (2+)     Bilirubin, UA Negative     Blood, UA Trace     Protein, UA Trace     Leuk Esterase, UA Negative     Nitrite, UA Negative     Urobilinogen, UA 0.2 E.U./dL    Urinalysis, Microscopic Only - Urine, Clean Catch [[id number]]  (Abnormal) Collected: 08/08/22 1306    Specimen: Urine, Clean Catch Updated: 08/08/22 1320     RBC, UA 6-12 /HPF      WBC, UA 0-2 /HPF      Bacteria, UA None Seen /HPF      Squamous Epithelial Cells, UA 0-2 /HPF      Hyaline Casts, UA None Seen /LPF      Methodology Automated Microscopy    Green Top (Gel) [[id number]] Collected: 08/08/22 1244    Specimen: Blood Updated: 08/08/22 1317     Extra Tube done    C-reactive Protein [[id number]]  (Abnormal) Collected: 08/08/22 1244    Specimen: Blood Updated: 08/08/22 1316     C-Reactive Protein 13.40 mg/dL     Lipase [[id number]]  (Normal) Collected: 08/08/22 1244    Specimen: Blood Updated: 08/08/22 1316     Lipase 14 U/L     Occult Blood, Fecal By Immunoassay - Stool, Per Rectum [[id number]]  (Abnormal) Collected: 08/08/22 1258    Specimen: Stool from Per Rectum Updated: 08/08/22 1316     Occult Blood, Fecal by Immunoassay Positive    Sedimentation Rate [[id number]]  (Abnormal) Collected: 08/08/22 1244    Specimen: Blood Updated: 08/08/22 1303     Sed Rate 40 mm/hr     POC Lactate [[id number]]  (Normal) Collected: 08/08/22 1248    Specimen: Blood Updated: 08/08/22 1249     Lactate 0.5 mmol/L      Comment: Serial Number: [id number]:  [id number]                    Condition on Discharge:  Stable     Vital Signs  Temp:  [98.2 °F (36.8 °C)-98.6 °F (37 °C)] 98.6 °F (37 °C)  Heart Rate:  [65-90] 82  Resp:  [17-18] 17  BP: (126-149)/(81-98) 131/81    Physical Exam:     General Appearance:    Alert, cooperative, in no acute distress   Head:    Normocephalic, without obvious  abnormality, atraumatic   Eyes:            Lids and lashes normal, conjunctivae and sclerae normal, no   icterus, no pallor, corneas clear, PERRLA   Ears:    Ears appear intact with no abnormalities noted   Throat:   No oral lesions, no thrush, oral mucosa moist   Neck:   No adenopathy, supple, trachea midline, no thyromegaly, no   carotid bruit, no JVD   Lungs:     Clear to auscultation,respirations regular, even and                  unlabored    Heart:    Regular rhythm and normal rate, normal S1 and S2, no            murmur, no gallop, no rub, no click   Chest Wall:    No abnormalities observed   Abdomen:     Normal bowel sounds, no masses, no organomegaly, soft        Mild TTP the LLQ , non-distended, no guarding, no rebound                tenderness   Extremities:   Moves all extremities well, no edema, no cyanosis, no             redness   Pulses:   Pulses palpable and equal bilaterally   Skin:   No bleeding, bruising or rash   Lymph nodes:   No palpable adenopathy   Neurologic:   Cranial nerves 2 - 12 grossly intact, sensation intact, DTR       present and equal bilaterally       Discharge Disposition  Home or Self Care    Discharge Medications     Discharge Medications      New Medications      Instructions Start Date   lidocaine 5 %  Commonly known as: LIDODERM   1 patch, Transdermal, Every 24 Hours Scheduled, Remove & Discard patch within 12 hours or as directed by MD   Start Date: August 13, 2022     mesalamine 1.2 g EC tablet  Commonly known as: LIALDA  Replaces: mesalamine 1000 MG suppository   2.4 g, Oral, 2 Times Daily         Continue These Medications      Instructions Start Date   ALPRAZolam 0.25 MG tablet  Commonly known as: XANAX   0.25 mg, Oral, Nightly PRN      Budesonide 3 MG 24 hr capsule  Commonly known as: ENTOCORT EC   9 mg, Oral, Every Morning      buPROPion  MG 24 hr tablet  Commonly known as: WELLBUTRIN XL   150 mg, Oral, Daily      dicyclomine 20 MG tablet  Commonly known as:  BENTYL   20 mg, Oral, Every 6 Hours         Stop These Medications    mesalamine 1000 MG suppository  Commonly known as: CANASA  Replaced by: mesalamine 1.2 g EC tablet            Discharge Diet:     Activity at Discharge:     Follow-up Appointments  No future appointments.  Additional Instructions for the Follow-ups that You Need to Schedule     Discharge Follow-up with PCP   As directed       Currently Documented PCP:    Virgil Burrell MD    PCP Phone Number:    [phone]     Follow Up Details: You have an appointement with Frandy Cain 2pm om 8/25. If you cannot keep this appointment please call and rechedule.               Test Results Pending at Discharge  Pending Labs     Order Current Status    Blood Culture - Blood, Arm, Left Preliminary result    Blood Culture - Blood, Arm, Right Preliminary result           MERISSA Banuelos  08/12/22  10:28 EDT    Time: Discharge 35 min

## 2022-08-12 NOTE — PLAN OF CARE
Goal Outcome Evaluation:      Discussed plan of care with patient. Pt was agreeable to plan and was able to rest with eyes closed throughout shift.

## 2022-08-12 NOTE — CASE MANAGEMENT/SOCIAL WORK
Case Management Discharge Note  Transportation Services  Private: Car    Final Discharge Disposition Code: 01 - home or self-care

## 2022-08-12 NOTE — PROGRESS NOTES
" LOS: 1 day   Patient Care Team:  Virgil Burrell MD as PCP - General (Family Medicine)  Kristy Ibarra MD as Consulting Physician (Gastroenterology)      Subjective \" I am feeling a lot better today\"    Interval History:     Subjective: Tolerating diet denies nausea or vomiting.  Less abdominal cramping.  Less diarrhea with 3-4 bowel movements yesterday and no further rectal bleeding.      ROS:   No chest pain, shortness of breath, or cough.       Medication Review:     Current Facility-Administered Medications:   •  acetaminophen (TYLENOL) tablet 650 mg, 650 mg, Oral, Q4H PRN, Anamika Greer MD  •  ALPRAZolam (XANAX) tablet 0.25 mg, 0.25 mg, Oral, Nightly PRN, Terri Wiseman APRN, 0.25 mg at 08/10/22 2321  •  buPROPion XL (WELLBUTRIN XL) 24 hr tablet 150 mg, 150 mg, Oral, Daily, Terri Wiseman APRN, 150 mg at 08/11/22 0850  •  dicyclomine (BENTYL) capsule 20 mg, 20 mg, Oral, 4x Daily, Terri Wiseman APRN, 20 mg at 08/11/22 2059  •  famotidine (PEPCID) tablet 40 mg, 40 mg, Oral, Daily, Anamika Greer MD, 40 mg at 08/11/22 0850  •  lidocaine (LIDODERM) 5 % 1 patch, 1 patch, Transdermal, Q24H, Quin Ramsey APRN, 1 patch at 08/11/22 0848  •  mesalamine (LIALDA) EC tablet 2.4 g, 2.4 g, Oral, BID, Eri Stockton APRN, 2.4 g at 08/11/22 2059  •  methylPREDNISolone sodium succinate (SOLU-Medrol) injection 20 mg, 20 mg, Intravenous, Q8H, Eri Stockton APRN, 20 mg at 08/12/22 0346  •  potassium chloride (K-DUR,KLOR-CON) CR tablet 40 mEq, 40 mEq, Oral, PRN, Anamika Greer MD  •  potassium chloride (KLOR-CON) packet 40 mEq, 40 mEq, Oral, PRN, Anamika Greer MD  •  promethazine (PHENERGAN) tablet 25 mg, 25 mg, Oral, Q6H PRN, Anamika Greer MD, 25 mg at 08/11/22 0459  •  sodium chloride 0.9 % flush 10 mL, 10 mL, Intravenous, PRN, Anamika Greer MD  •  sodium chloride 0.9 % flush 3 mL, 3 mL, Intravenous, Q12H, Anamika Greer MD, 3 mL at 08/11/22 2100  •  sodium chloride 0.9 % flush 3-10 mL, 3-10 mL, Intravenous, PRN, Percy, " MD Anamika  •  sodium chloride 0.9 % infusion, 100 mL/hr, Intravenous, Continuous, Anamika Greer MD, Last Rate: 100 mL/hr at 08/11/22 1708, 100 mL/hr at 08/11/22 1708  •  traMADol (ULTRAM) tablet 50 mg, 50 mg, Oral, Q6H PRN, Eri Stockton, APRN, 50 mg at 08/11/22 2102      Objective -Resting in bed, no acute distress, no family present    Vital Signs  Vitals:    08/11/22 1856 08/11/22 2300 08/12/22 0314 08/12/22 0726   BP: 149/98 127/82 139/84    BP Location: Left arm Left arm Left arm    Patient Position: Lying Lying Lying    Pulse: 88 74 65 90   Resp: 18 17 18 17   Temp: 98.3 °F (36.8 °C) 98.2 °F (36.8 °C) 98.2 °F (36.8 °C) 98.6 °F (37 °C)   TempSrc: Oral Oral Oral Oral   SpO2: 98% 96% 97% 97%   Weight:       Height:           Physical Exam:     General Appearance:    Awake and alert, in no acute distress   Head:    Normocephalic, without obvious abnormality   Eyes:          Conjunctivae normal, anicteric sclera   Throat:   No oral lesions, no thrush, oral mucosa moist   Neck:   No adenopathy, supple, no JVD   Lungs:     respirations regular, even and unlabored       Rectal:     Deferred   Extremities:   No edema, no cyanosis   Skin:   No bruising or rash, no jaundice        Results Review:    CBC    Results from last 7 days   Lab Units 08/12/22 0446 08/11/22  0126 08/10/22  0238 08/08/22  2346 08/08/22  1244   WBC 10*3/mm3 19.40* 22.70* 17.60* 22.40* 19.10*   HEMOGLOBIN g/dL 11.8* 11.0* 10.7* 12.6 14.5   PLATELETS 10*3/mm3 478* 474* 472* 456* 528*     CMP   Results from last 7 days   Lab Units 08/12/22  0446 08/11/22  0126 08/10/22  0238 08/08/22  2346 08/08/22  1244   SODIUM mmol/L 137 138 140 136 136   POTASSIUM mmol/L 4.1 4.5 4.0 4.2 4.4   CHLORIDE mmol/L 102 104 105 99 98   CO2 mmol/L 26.0 26.0 27.0 27.0 27.0   BUN mg/dL 9 8 7 6 6   CREATININE mg/dL 0.56* 0.64 0.59 0.73 0.77   GLUCOSE mg/dL 118* 169* 174* 104* 112*   ALBUMIN g/dL 3.10* 3.30* 3.00*  --  4.00   BILIRUBIN mg/dL <0.2 <0.2 <0.2  --  0.3   ALK PHOS  U/L 64 59 77  --  91   AST (SGOT) U/L 12 9 6  --  13   ALT (SGPT) U/L 14 10 9  --  15   LIPASE U/L  --   --   --   --  14     Cr Clearance Estimated Creatinine Clearance: 118.5 mL/min (A) (by C-G formula based on SCr of 0.56 mg/dL (L)).  Coag     HbA1C No results found for: HGBA1C  Blood Glucose No results found for: POCGLU  Infection   Results from last 7 days   Lab Units 08/08/22  1255 08/08/22  1244   BLOODCX  No growth at 3 days No growth at 3 days     UA    Results from last 7 days   Lab Units 08/08/22  1306   NITRITE UA  Negative   WBC UA /HPF 0-2*   BACTERIA UA /HPF None Seen   SQUAM EPITHEL UA /HPF 0-2     Radiology(recent) No radiology results for the last day       Assessment & Plan   Ulcerative colitis with flare  Leukocytosis -likely secondary to above  Anxiety     PLAN:  She continues to improve with supportive care, dicyclomine, IV steroids and mesalamine.  Okay to stop IV Flagyl.  Avoid narcotics.  Okay to discharge home from GI standpoint if otherwise medically stable.  She has budesonide at home which she will taper as previously prescribed.  I have already called in Liaa to her pharmacy for her to continue as well.  Okay for dicyclomine as needed.  No need for antibiotics upon discharge.  No need for prednisone at discharge either.  She will follow-up in our office as scheduled.  We will see inpatient as needed, call questions or concerns.    Electronically signed by MERISSA Cm, 08/12/22, 9:17 AM EDT.

## 2022-08-13 ENCOUNTER — READMISSION MANAGEMENT (OUTPATIENT)
Dept: CALL CENTER | Facility: HOSPITAL | Age: 42
End: 2022-08-13

## 2022-08-13 LAB
BACTERIA SPEC AEROBE CULT: NORMAL
BACTERIA SPEC AEROBE CULT: NORMAL

## 2022-08-13 NOTE — OUTREACH NOTE
Prep Survey    Flowsheet Row Responses   [facility] facility patient discharged from? [facility]   Is LACE score < 7 ? No   Emergency Room discharge w/ pulse ox? No   Eligibility Readm Mgmt   Discharge diagnosis inflammatory colitis   Does the patient have one of the following disease processes/diagnoses(primary or secondary)? Other   Is there a DME ordered? No   Prep survey completed? Yes          NORMAN ZUNIGA - Registered Nurse

## 2022-08-15 ENCOUNTER — READMISSION MANAGEMENT (OUTPATIENT)
Dept: CALL CENTER | Facility: HOSPITAL | Age: 42
End: 2022-08-15

## 2022-08-15 NOTE — OUTREACH NOTE
Medical Week 1 Survey    Flowsheet Row Responses   [facility] patient discharged from? [facility]   Does the patient have one of the following disease processes/diagnoses(primary or secondary)? Other   Week 1 attempt successful? Yes   Call start time 1521   Call end time 1524   Discharge diagnosis inflammatory colitis   Person spoke with today (if not patient) and relationship patient   Meds reviewed with patient/caregiver? Yes   Is the patient having any side effects they believe may be caused by any medication additions or changes? No   Does the patient have all medications ordered at discharge? Yes   Is the patient taking all medications as directed (includes completed medication regime)? Yes   Does the patient have a primary care provider?  Yes   Does the patient have an appointment with their PCP within 7 days of discharge? Greater than 7 days   Comments regarding PCP PCP - pt has appt. 08/25    What is preventing the patient from scheduling follow up appointments within 7 days of discharge? Earlier appointment not available   Has the patient kept scheduled appointments due by today? N/A   Psychosocial issues? No   Did the patient receive a copy of their discharge instructions? Yes   Nursing interventions Reviewed instructions with patient   What is the patient's perception of their health status since discharge? Returned to baseline/stable   Is the patient/caregiver able to teach back the hierarchy of who to call/visit for symptoms/problems? PCP, Specialist, Home health nurse, Urgent Care, ED, 911 Yes   If the patient is a current smoker, are they able to teach back resources for cessation? Not a smoker   Week 1 call completed? Yes   Is the patient interested in additional calls from an ambulatory ?  NOTE:  applies to high risk patients requiring additional follow-up. No   Revoked No further contact(revokes)-requires comment   Graduated/Revoked comments Pt states she is doing great.  Denies needs.           NASREEN GOMEZ - Registered Nurse

## 2022-09-12 ENCOUNTER — OFFICE (AMBULATORY)
Dept: URBAN - METROPOLITAN AREA CLINIC 64 | Facility: CLINIC | Age: 42
End: 2022-09-12

## 2022-09-12 VITALS
WEIGHT: 148 LBS | HEIGHT: 63 IN | SYSTOLIC BLOOD PRESSURE: 132 MMHG | HEART RATE: 66 BPM | DIASTOLIC BLOOD PRESSURE: 84 MMHG

## 2022-09-12 DIAGNOSIS — K51.90 ULCERATIVE COLITIS, UNSPECIFIED, WITHOUT COMPLICATIONS: ICD-10-CM

## 2022-09-12 PROCEDURE — 99214 OFFICE O/P EST MOD 30 MIN: CPT | Performed by: INTERNAL MEDICINE

## 2022-09-12 RX ORDER — MESALAMINE 4 G/60 ML
KIT RECTAL
Qty: 30 | Refills: 3 | Status: COMPLETED
Start: 2022-09-12 | End: 2023-01-13

## 2022-09-12 RX ORDER — BUDESONIDE 3 MG/1
6 CAPSULE ORAL
Qty: 180 | Refills: 3 | Status: COMPLETED
Start: 2022-09-12 | End: 2023-03-14

## 2022-11-16 ENCOUNTER — OFFICE (AMBULATORY)
Dept: URBAN - METROPOLITAN AREA CLINIC 64 | Facility: CLINIC | Age: 42
End: 2022-11-16

## 2022-11-16 VITALS
WEIGHT: 162 LBS | DIASTOLIC BLOOD PRESSURE: 99 MMHG | HEART RATE: 71 BPM | SYSTOLIC BLOOD PRESSURE: 141 MMHG | HEIGHT: 63 IN

## 2022-11-16 DIAGNOSIS — K51.90 ULCERATIVE COLITIS, UNSPECIFIED, WITHOUT COMPLICATIONS: ICD-10-CM

## 2022-11-16 PROCEDURE — 99214 OFFICE O/P EST MOD 30 MIN: CPT | Performed by: INTERNAL MEDICINE

## 2022-11-16 RX ORDER — BUDESONIDE 3 MG/1
6 CAPSULE ORAL
Qty: 180 | Refills: 3 | Status: COMPLETED
Start: 2022-09-12 | End: 2023-03-14

## 2022-11-16 RX ORDER — MESALAMINE 4 G/60 ML
KIT RECTAL
Qty: 30 | Refills: 3 | Status: COMPLETED
Start: 2022-09-12 | End: 2023-01-13

## 2023-01-13 ENCOUNTER — OFFICE (AMBULATORY)
Dept: URBAN - METROPOLITAN AREA CLINIC 64 | Facility: CLINIC | Age: 43
End: 2023-01-13

## 2023-01-13 VITALS
WEIGHT: 164 LBS | SYSTOLIC BLOOD PRESSURE: 126 MMHG | HEART RATE: 76 BPM | DIASTOLIC BLOOD PRESSURE: 98 MMHG | HEIGHT: 63 IN

## 2023-01-13 DIAGNOSIS — K51.90 ULCERATIVE COLITIS, UNSPECIFIED, WITHOUT COMPLICATIONS: ICD-10-CM

## 2023-01-13 PROCEDURE — 99214 OFFICE O/P EST MOD 30 MIN: CPT | Performed by: INTERNAL MEDICINE

## 2023-03-08 ENCOUNTER — HOSPITAL ENCOUNTER (OUTPATIENT)
Facility: HOSPITAL | Age: 43
Setting detail: OBSERVATION
Discharge: HOME | End: 2023-03-09
Attending: EMERGENCY MEDICINE | Admitting: INTERNAL MEDICINE
Payer: COMMERCIAL

## 2023-03-08 DIAGNOSIS — N61.0 MASTITIS: ICD-10-CM

## 2023-03-08 DIAGNOSIS — N61.0 CELLULITIS OF FEMALE BREAST: Primary | ICD-10-CM

## 2023-03-08 LAB
ALBUMIN SERPL-MCNC: 4.5 G/DL (ref 3.5–5.2)
ALBUMIN/GLOB SERPL: 1.6 G/DL
ALP SERPL-CCNC: 34 U/L (ref 39–117)
ALT SERPL W P-5'-P-CCNC: 7 U/L (ref 1–33)
ANION GAP SERPL CALCULATED.3IONS-SCNC: 8.9 MMOL/L (ref 5–15)
AST SERPL-CCNC: 9 U/L (ref 1–32)
BASOPHILS # BLD AUTO: 0.02 10*3/MM3 (ref 0–0.2)
BASOPHILS NFR BLD AUTO: 0.1 % (ref 0–1.5)
BILIRUB SERPL-MCNC: 0.2 MG/DL (ref 0–1.2)
BUN SERPL-MCNC: 11 MG/DL (ref 6–20)
BUN/CREAT SERPL: 16.4 (ref 7–25)
CALCIUM SPEC-SCNC: 9.2 MG/DL (ref 8.6–10.5)
CHLORIDE SERPL-SCNC: 101 MMOL/L (ref 98–107)
CO2 SERPL-SCNC: 27.1 MMOL/L (ref 22–29)
CREAT SERPL-MCNC: 0.67 MG/DL (ref 0.57–1)
D-LACTATE SERPL-SCNC: 0.7 MMOL/L (ref 0.5–2)
DEPRECATED RDW RBC AUTO: 43.2 FL (ref 37–54)
EGFRCR SERPLBLD CKD-EPI 2021: 112.8 ML/MIN/1.73
EOSINOPHIL # BLD AUTO: 0.06 10*3/MM3 (ref 0–0.4)
EOSINOPHIL NFR BLD AUTO: 0.4 % (ref 0.3–6.2)
ERYTHROCYTE [DISTWIDTH] IN BLOOD BY AUTOMATED COUNT: 12.6 % (ref 12.3–15.4)
GLOBULIN UR ELPH-MCNC: 2.8 GM/DL
GLUCOSE SERPL-MCNC: 86 MG/DL (ref 65–99)
HCT VFR BLD AUTO: 40.5 % (ref 34–46.6)
HGB BLD-MCNC: 13 G/DL (ref 12–15.9)
IMM GRANULOCYTES # BLD AUTO: 0.03 10*3/MM3 (ref 0–0.05)
IMM GRANULOCYTES NFR BLD AUTO: 0.2 % (ref 0–0.5)
LYMPHOCYTES # BLD AUTO: 2.28 10*3/MM3 (ref 0.7–3.1)
LYMPHOCYTES NFR BLD AUTO: 14.4 % (ref 19.6–45.3)
MCH RBC QN AUTO: 29.5 PG (ref 26.6–33)
MCHC RBC AUTO-ENTMCNC: 32.1 G/DL (ref 31.5–35.7)
MCV RBC AUTO: 91.8 FL (ref 79–97)
MONOCYTES # BLD AUTO: 0.94 10*3/MM3 (ref 0.1–0.9)
MONOCYTES NFR BLD AUTO: 5.9 % (ref 5–12)
NEUTROPHILS NFR BLD AUTO: 12.48 10*3/MM3 (ref 1.7–7)
NEUTROPHILS NFR BLD AUTO: 79 % (ref 42.7–76)
PLATELET # BLD AUTO: 303 10*3/MM3 (ref 140–450)
PMV BLD AUTO: 10 FL (ref 6–12)
POTASSIUM SERPL-SCNC: 3.5 MMOL/L (ref 3.5–5.2)
PROT SERPL-MCNC: 7.3 G/DL (ref 6–8.5)
RBC # BLD AUTO: 4.41 10*6/MM3 (ref 3.77–5.28)
SODIUM SERPL-SCNC: 137 MMOL/L (ref 136–145)
WBC NRBC COR # BLD: 15.81 10*3/MM3 (ref 3.4–10.8)

## 2023-03-08 PROCEDURE — 80053 COMPREHEN METABOLIC PANEL: CPT | Performed by: EMERGENCY MEDICINE

## 2023-03-08 PROCEDURE — 87040 BLOOD CULTURE FOR BACTERIA: CPT | Performed by: EMERGENCY MEDICINE

## 2023-03-08 PROCEDURE — 25010000002 KETOROLAC TROMETHAMINE PER 15 MG: Performed by: EMERGENCY MEDICINE

## 2023-03-08 PROCEDURE — 96365 THER/PROPH/DIAG IV INF INIT: CPT

## 2023-03-08 PROCEDURE — 99284 EMERGENCY DEPT VISIT MOD MDM: CPT

## 2023-03-08 PROCEDURE — 99283 EMERGENCY DEPT VISIT LOW MDM: CPT | Performed by: EMERGENCY MEDICINE

## 2023-03-08 PROCEDURE — 85025 COMPLETE CBC W/AUTO DIFF WBC: CPT | Performed by: EMERGENCY MEDICINE

## 2023-03-08 PROCEDURE — 83605 ASSAY OF LACTIC ACID: CPT | Performed by: EMERGENCY MEDICINE

## 2023-03-08 PROCEDURE — 25010000002 CEFTRIAXONE PER 250 MG: Performed by: EMERGENCY MEDICINE

## 2023-03-08 PROCEDURE — 96375 TX/PRO/DX INJ NEW DRUG ADDON: CPT

## 2023-03-08 PROCEDURE — 25010000002 ONDANSETRON PER 1 MG: Performed by: EMERGENCY MEDICINE

## 2023-03-08 PROCEDURE — 25010000002 MORPHINE PER 10 MG: Performed by: EMERGENCY MEDICINE

## 2023-03-08 RX ORDER — KETOROLAC TROMETHAMINE 15 MG/ML
15 INJECTION, SOLUTION INTRAMUSCULAR; INTRAVENOUS ONCE
Status: COMPLETED | OUTPATIENT
Start: 2023-03-08 | End: 2023-03-08

## 2023-03-08 RX ORDER — ONDANSETRON 2 MG/ML
4 INJECTION INTRAMUSCULAR; INTRAVENOUS ONCE
Status: COMPLETED | OUTPATIENT
Start: 2023-03-08 | End: 2023-03-08

## 2023-03-08 RX ORDER — MORPHINE SULFATE 2 MG/ML
2 INJECTION, SOLUTION INTRAMUSCULAR; INTRAVENOUS ONCE
Status: COMPLETED | OUTPATIENT
Start: 2023-03-08 | End: 2023-03-08

## 2023-03-08 RX ADMIN — MORPHINE SULFATE 2 MG: 2 INJECTION, SOLUTION INTRAMUSCULAR; INTRAVENOUS at 22:53

## 2023-03-08 RX ADMIN — CEFTRIAXONE 1 G: 1 INJECTION, POWDER, FOR SOLUTION INTRAMUSCULAR; INTRAVENOUS at 21:38

## 2023-03-08 RX ADMIN — ONDANSETRON 4 MG: 2 INJECTION INTRAMUSCULAR; INTRAVENOUS at 22:53

## 2023-03-08 RX ADMIN — KETOROLAC TROMETHAMINE 15 MG: 15 INJECTION, SOLUTION INTRAMUSCULAR; INTRAVENOUS at 22:12

## 2023-03-09 ENCOUNTER — APPOINTMENT (OUTPATIENT)
Dept: ULTRASOUND IMAGING | Facility: HOSPITAL | Age: 43
End: 2023-03-09
Payer: COMMERCIAL

## 2023-03-09 VITALS
SYSTOLIC BLOOD PRESSURE: 104 MMHG | RESPIRATION RATE: 16 BRPM | TEMPERATURE: 98.3 F | HEART RATE: 77 BPM | BODY MASS INDEX: 26.58 KG/M2 | DIASTOLIC BLOOD PRESSURE: 71 MMHG | HEIGHT: 63 IN | OXYGEN SATURATION: 95 % | WEIGHT: 150 LBS

## 2023-03-09 PROCEDURE — 76642 ULTRASOUND BREAST LIMITED: CPT

## 2023-03-09 PROCEDURE — G0378 HOSPITAL OBSERVATION PER HR: HCPCS

## 2023-03-09 RX ORDER — ALPRAZOLAM 0.25 MG/1
0.25 TABLET ORAL NIGHTLY PRN
Status: DISCONTINUED | OUTPATIENT
Start: 2023-03-09 | End: 2023-03-09 | Stop reason: HOSPADM

## 2023-03-09 RX ORDER — ACETAMINOPHEN 325 MG/1
650 TABLET ORAL EVERY 4 HOURS PRN
Status: DISCONTINUED | OUTPATIENT
Start: 2023-03-09 | End: 2023-03-09 | Stop reason: HOSPADM

## 2023-03-09 RX ORDER — HYDROCODONE BITARTRATE AND ACETAMINOPHEN 5; 325 MG/1; MG/1
1 TABLET ORAL EVERY 6 HOURS PRN
Qty: 12 TABLET | Refills: 0 | Status: SHIPPED | OUTPATIENT
Start: 2023-03-09 | End: 2023-03-16

## 2023-03-09 RX ORDER — KETOROLAC TROMETHAMINE 15 MG/ML
15 INJECTION, SOLUTION INTRAMUSCULAR; INTRAVENOUS EVERY 6 HOURS PRN
Status: DISCONTINUED | OUTPATIENT
Start: 2023-03-09 | End: 2023-03-09 | Stop reason: HOSPADM

## 2023-03-09 RX ORDER — ACETAMINOPHEN 325 MG/1
650 TABLET ORAL EVERY 4 HOURS PRN
Start: 2023-03-09

## 2023-03-09 RX ORDER — MESALAMINE 1.2 G/1
1.2 TABLET, DELAYED RELEASE ORAL 2 TIMES DAILY
Status: DISCONTINUED | OUTPATIENT
Start: 2023-03-09 | End: 2023-03-09 | Stop reason: HOSPADM

## 2023-03-09 RX ORDER — ONDANSETRON 2 MG/ML
4 INJECTION INTRAMUSCULAR; INTRAVENOUS EVERY 6 HOURS PRN
Status: DISCONTINUED | OUTPATIENT
Start: 2023-03-09 | End: 2023-03-09 | Stop reason: HOSPADM

## 2023-03-09 RX ORDER — DICYCLOMINE HYDROCHLORIDE 10 MG/1
20 CAPSULE ORAL 4 TIMES DAILY
Status: DISCONTINUED | OUTPATIENT
Start: 2023-03-09 | End: 2023-03-09 | Stop reason: HOSPADM

## 2023-03-09 RX ORDER — SODIUM CHLORIDE 0.9 % (FLUSH) 0.9 %
3-10 SYRINGE (ML) INJECTION AS NEEDED
Status: DISCONTINUED | OUTPATIENT
Start: 2023-03-09 | End: 2023-03-09 | Stop reason: HOSPADM

## 2023-03-09 RX ORDER — SODIUM CHLORIDE 9 MG/ML
40 INJECTION, SOLUTION INTRAVENOUS AS NEEDED
Status: DISCONTINUED | OUTPATIENT
Start: 2023-03-09 | End: 2023-03-09 | Stop reason: HOSPADM

## 2023-03-09 RX ORDER — SODIUM CHLORIDE 0.9 % (FLUSH) 0.9 %
3 SYRINGE (ML) INJECTION EVERY 12 HOURS SCHEDULED
Status: DISCONTINUED | OUTPATIENT
Start: 2023-03-09 | End: 2023-03-09 | Stop reason: HOSPADM

## 2023-03-09 RX ORDER — CEPHALEXIN 500 MG/1
500 CAPSULE ORAL 3 TIMES DAILY
Qty: 24 CAPSULE | Refills: 0 | Status: SHIPPED | OUTPATIENT
Start: 2023-03-09 | End: 2023-03-17

## 2023-03-09 RX ORDER — LIDOCAINE 50 MG/G
1 PATCH TOPICAL
Status: DISCONTINUED | OUTPATIENT
Start: 2023-03-09 | End: 2023-03-09 | Stop reason: HOSPADM

## 2023-03-09 RX ORDER — HYDROCODONE BITARTRATE AND ACETAMINOPHEN 5; 325 MG/1; MG/1
1 TABLET ORAL EVERY 6 HOURS PRN
Status: DISCONTINUED | OUTPATIENT
Start: 2023-03-09 | End: 2023-03-09 | Stop reason: HOSPADM

## 2023-03-09 RX ORDER — BUPROPION HYDROCHLORIDE 150 MG/1
150 TABLET ORAL DAILY
Status: DISCONTINUED | OUTPATIENT
Start: 2023-03-09 | End: 2023-03-09 | Stop reason: HOSPADM

## 2023-03-09 RX ORDER — BUDESONIDE 3 MG/1
9 CAPSULE, COATED PELLETS ORAL DAILY
Status: DISCONTINUED | OUTPATIENT
Start: 2023-03-09 | End: 2023-03-09 | Stop reason: HOSPADM

## 2023-03-09 RX ADMIN — DICYCLOMINE HYDROCHLORIDE 20 MG: 10 CAPSULE ORAL at 13:25

## 2023-03-09 RX ADMIN — LIDOCAINE 1 PATCH: 700 PATCH TOPICAL at 08:52

## 2023-03-09 RX ADMIN — MESALAMINE 1.2 G: 1.2 TABLET, DELAYED RELEASE ORAL at 08:52

## 2023-03-09 RX ADMIN — HYDROCODONE BITARTRATE AND ACETAMINOPHEN 1 TABLET: 5; 325 TABLET ORAL at 10:04

## 2023-03-09 RX ADMIN — Medication 3 ML: at 08:52

## 2023-03-09 RX ADMIN — HYDROCODONE BITARTRATE AND ACETAMINOPHEN 1 TABLET: 5; 325 TABLET ORAL at 03:38

## 2023-03-09 RX ADMIN — BUPROPION HYDROCHLORIDE 150 MG: 150 TABLET, EXTENDED RELEASE ORAL at 08:45

## 2023-03-09 RX ADMIN — DICYCLOMINE HYDROCHLORIDE 20 MG: 10 CAPSULE ORAL at 08:45

## 2023-03-09 NOTE — CASE MANAGEMENT/SOCIAL WORK
Continued Stay Note  NOBLE Saez     Patient Name: Sanam Tijerina  MRN: [id number]  Today's Date: 3/9/2023    Admit Date: 3/8/2023        Discharge Plan     Row Name 03/09/23 1522       Plan    Plan Comments Chart review: no d/c needs. D/C orders in place in chart. CM will f/u tomorrow if pt does not d/c today.                   Expected Discharge Date and Time     Expected Discharge Date Expected Discharge Time    Mar 9, 2023         Phone communication or documentation only - no physical contact with patient or family.      Jose Bliss RN

## 2023-03-09 NOTE — H&P
Patient Care Team:  Virgil Burrell MD as PCP - General (Family Medicine)  Kristy Ibarra MD as Consulting Physician (Gastroenterology)    Chief complaint breast tenderness    Subjective     Patient is a 42 y.o. female who was transferred for [facility] ER who presents with complaints of left sided breast tenderness that started this morning and has progressively became worse. She denies any nipple drainage or discharge. Denies any fever, chills, nausea, vomiting, diarrhea. Reports a hysterectomy 2 years ago.   Patient was started on IV rocephin for left breast cellulitis.      Onset of symptoms was 1 day    Review of Systems   Constitutional: Negative.    HENT: Negative.    Eyes: Negative.    Respiratory: Negative.    Cardiovascular: Negative.    Gastrointestinal: Negative.    Endocrine: Negative.    Genitourinary: Negative.    Musculoskeletal: Negative.    Skin: Positive for rash.        Left breast erythema   Neurological: Negative.    Psychiatric/Behavioral: Negative.           History  Past Medical History:   Diagnosis Date   • Ulcerative colitis (HCC)      Past Surgical History:   Procedure Laterality Date   • HYSTERECTOMY       History reviewed. No pertinent family history.  Social History     Tobacco Use   • Smoking status: Never   • Smokeless tobacco: Never   Vaping Use   • Vaping Use: Never used   Substance Use Topics   • Alcohol use: Never   • Drug use: Never     Medications Prior to Admission   Medication Sig Dispense Refill Last Dose   • ALPRAZolam (XANAX) 0.25 MG tablet Take 1 tablet by mouth At Night As Needed for Anxiety.   3/8/2023   • Budesonide (ENTOCORT EC) 3 MG 24 hr capsule Take 3 capsules by mouth Every Morning.   3/8/2023   • buPROPion XL (WELLBUTRIN XL) 150 MG 24 hr tablet Take 1 tablet by mouth Daily.   3/9/2023   • dicyclomine (BENTYL) 20 MG tablet Take 1 tablet by mouth Every 6 (Six) Hours.   3/9/2023   • mesalamine (LIALDA) 1.2 g EC tablet Take 2 tablets  by mouth 2 (Two) Times a Day. 60 tablet 1 3/9/2023   • lidocaine (LIDODERM) 5 % Place 1 patch on the skin as directed by provider Daily. Remove & Discard patch within 12 hours or as directed by MD 30 patch 1 More than a month     Allergies:  Flagyl [metronidazole]    Objective     Vital Signs  Temp:  [98.6 °F (37 °C)-99.1 °F (37.3 °C)] 98.9 °F (37.2 °C)  Heart Rate:  [88-92] 88  Resp:  [16] 16  BP: (118-124)/(78-93) 120/78     Physical Exam:      General Appearance:    Alert, cooperative, in no acute distress   Head:    Normocephalic, without obvious abnormality, atraumatic   Eyes:            Lids and lashes normal, conjunctivae and sclerae normal, no   icterus, no pallor, corneas clear, PERRLA   Ears:    Ears appear intact with no abnormalities noted   Throat:   No oral lesions, no thrush, oral mucosa moist   Neck:   No adenopathy, supple, trachea midline, no thyromegaly, no   carotid bruit, no JVD   Lungs:     Clear to auscultation,respirations regular, even and                  unlabored    Heart:    Regular rhythm and normal rate, normal S1 and S2, no            murmur, no gallop, no rub, no click   Chest Wall:    Left breast with swelling, erythema, tenderness present. No nipple disharge   Abdomen:     Normal bowel sounds, no masses, no organomegaly, soft        non-tender, non-distended, no guarding, no rebound                tenderness   Extremities:   Moves all extremities well, no edema, no cyanosis, no             redness   Pulses:   Pulses palpable and equal bilaterally   Skin:   No bleeding, bruising or rash   Lymph nodes:   No palpable adenopathy   Neurologic:   No focal deficits noted       Results Review:     Imaging Results (Last 24 Hours)     ** No results found for the last 24 hours. **           Lab Results (last 24 hours)     Procedure Component Value Units Date/Time    Comprehensive Metabolic Panel [[id number]]  (Abnormal) Collected: 03/08/23 2021    Specimen: Blood Updated: 03/08/23 2058      Glucose 86 mg/dL      BUN 11 mg/dL      Creatinine 0.67 mg/dL      Sodium 137 mmol/L      Potassium 3.5 mmol/L      Chloride 101 mmol/L      CO2 27.1 mmol/L      Calcium 9.2 mg/dL      Total Protein 7.3 g/dL      Albumin 4.5 g/dL      ALT (SGPT) 7 U/L      AST (SGOT) 9 U/L      Alkaline Phosphatase 34 U/L      Total Bilirubin 0.2 mg/dL      Globulin 2.8 gm/dL      A/G Ratio 1.6 g/dL      BUN/Creatinine Ratio 16.4     Anion Gap 8.9 mmol/L      eGFR 112.8 mL/min/1.73     Narrative:      GFR Normal >60  Chronic Kidney Disease <60  Kidney Failure <15      Lactic Acid, Plasma [[id number]]  (Normal) Collected: 03/08/23 2021    Specimen: Blood Updated: 03/08/23 2054     Lactate 0.7 mmol/L     CBC & Differential [[id number]]  (Abnormal) Collected: 03/08/23 2021    Specimen: Blood Updated: 03/08/23 2035    Narrative:      The following orders were created for panel order CBC & Differential.  Procedure                               Abnormality         Status                     ---------                               -----------         ------                     CBC Auto Differential[[id number]]        Abnormal            Final result                 Please view results for these tests on the individual orders.    CBC Auto Differential [[id number]]  (Abnormal) Collected: 03/08/23 2021    Specimen: Blood Updated: 03/08/23 2035     WBC 15.81 10*3/mm3      RBC 4.41 10*6/mm3      Hemoglobin 13.0 g/dL      Hematocrit 40.5 %      MCV 91.8 fL      MCH 29.5 pg      MCHC 32.1 g/dL      RDW 12.6 %      RDW-SD 43.2 fl      MPV 10.0 fL      Platelets 303 10*3/mm3      Neutrophil % 79.0 %      Lymphocyte % 14.4 %      Monocyte % 5.9 %      Eosinophil % 0.4 %      Basophil % 0.1 %      Immature Grans % 0.2 %      Neutrophils, Absolute 12.48 10*3/mm3      Lymphocytes, Absolute 2.28 10*3/mm3      Monocytes, Absolute 0.94 10*3/mm3      Eosinophils, Absolute 0.06 10*3/mm3      Basophils, Absolute 0.02 10*3/mm3      Immature Grans, Absolute 0.03  10*3/mm3     Blood Culture - Blood, Arm, Left [[id number]] Collected: 03/08/23 2021    Specimen: Blood from Arm, Left Updated: 03/08/23 2032    Blood Culture - Blood, Arm, Left [[id number]] Collected: 03/08/23 2021    Specimen: Blood from Arm, Left Updated: 03/08/23 2032           I reviewed the patient's new clinical results.    Assessment & Plan       Cellulitis of female breast  -started on IV rocephin  -WBC 15.81  -blood cultures pending        DVT prophylaxis-SCD's  GI prophylaxis- protonix    I discussed the patient's findings and my recommendations with patient.     Deneen Romano, MERISSA  03/09/23  03:27 EST

## 2023-03-09 NOTE — PLAN OF CARE
Goal Outcome Evaluation:         Goals met, patient DC home with family. Patient to ultra sound today, tolerated well.   Problem: Adult Inpatient Plan of Care  Goal: Plan of Care Review  Outcome: Met  Goal: Patient-Specific Goal (Individualized)  Outcome: Met  Goal: Absence of Hospital-Acquired Illness or Injury  Outcome: Met  Intervention: Identify and Manage Fall Risk  Recent Flowsheet Documentation  Taken 3/9/2023 1400 by Hilda Hillman RN  Safety Promotion/Fall Prevention:   safety round/check completed   room organization consistent   nonskid shoes/slippers when out of bed   clutter free environment maintained   assistive device/personal items within reach  Taken 3/9/2023 1200 by Hilda Hillman RN  Safety Promotion/Fall Prevention:   safety round/check completed   room organization consistent   nonskid shoes/slippers when out of bed   clutter free environment maintained   assistive device/personal items within reach  Taken 3/9/2023 1004 by Hilda Hillman RN  Safety Promotion/Fall Prevention:   safety round/check completed   room organization consistent   nonskid shoes/slippers when out of bed   clutter free environment maintained   assistive device/personal items within reach  Taken 3/9/2023 0853 by Hilda Hillman RN  Safety Promotion/Fall Prevention:   safety round/check completed   room organization consistent   nonskid shoes/slippers when out of bed   clutter free environment maintained   assistive device/personal items within reach  Intervention: Prevent Skin Injury  Recent Flowsheet Documentation  Taken 3/9/2023 0853 by Hilda Hillman RN  Body Position: position changed independently  Skin Protection:   adhesive use limited   transparent dressing maintained   tubing/devices free from skin contact  Intervention: Prevent and Manage VTE (Venous Thromboembolism) Risk  Recent Flowsheet Documentation  Taken 3/9/2023 0853 by Hilda Hillman RN  Activity Management:   activity adjusted per  tolerance   activity encouraged   up ad nolan  VTE Prevention/Management: (up ad nolan in room)   bilateral   sequential compression devices off  Range of Motion: active ROM (range of motion) encouraged  Intervention: Prevent Infection  Recent Flowsheet Documentation  Taken 3/9/2023 1400 by Hilda Hillman RN  Infection Prevention:   single patient room provided   hand hygiene promoted   environmental surveillance performed  Taken 3/9/2023 1200 by Hilda Hillman RN  Infection Prevention:   single patient room provided   hand hygiene promoted   environmental surveillance performed  Taken 3/9/2023 1004 by Hilda Hillman RN  Infection Prevention:   single patient room provided   hand hygiene promoted   environmental surveillance performed  Taken 3/9/2023 0853 by Hilda Hillman RN  Infection Prevention:   single patient room provided   hand hygiene promoted   environmental surveillance performed  Goal: Optimal Comfort and Wellbeing  Outcome: Met  Intervention: Monitor Pain and Promote Comfort  Recent Flowsheet Documentation  Taken 3/9/2023 1400 by Hilda Hillman RN  Pain Management Interventions:   quiet environment facilitated   cold applied  Taken 3/9/2023 1200 by Hilda Hillman RN  Pain Management Interventions:   quiet environment facilitated   cold applied  Taken 3/9/2023 1004 by Hilda Hillman RN  Pain Management Interventions:   quiet environment facilitated   see MAR  Taken 3/9/2023 0853 by Hilda Hillman RN  Pain Management Interventions:   quiet environment facilitated   pain management plan reviewed with patient/caregiver  Intervention: Provide Person-Centered Care  Recent Flowsheet Documentation  Taken 3/9/2023 0853 by Hilda Hillman RN  Trust Relationship/Rapport:   care explained   questions answered   questions encouraged  Goal: Readiness for Transition of Care  Outcome: Met     Problem: Pain Acute  Goal: Acceptable Pain Control and Functional Ability  Outcome:  Met  Intervention: Prevent or Manage Pain  Recent Flowsheet Documentation  Taken 3/9/2023 0853 by Hilda Hillman RN  Bowel Elimination Promotion:   adequate fluid intake promoted   ambulation promoted  Sleep/Rest Enhancement:   awakenings minimized   room darkened  Medication Review/Management: medications reviewed  Intervention: Develop Pain Management Plan  Recent Flowsheet Documentation  Taken 3/9/2023 1400 by Hilda Hillman RN  Pain Management Interventions:   quiet environment facilitated   cold applied  Taken 3/9/2023 1200 by Hilda Hillman RN  Pain Management Interventions:   quiet environment facilitated   cold applied  Taken 3/9/2023 1004 by Hilda Hillman RN  Pain Management Interventions:   quiet environment facilitated   see MAR  Taken 3/9/2023 0853 by Hilda Hillman RN  Pain Management Interventions:   quiet environment facilitated   pain management plan reviewed with patient/caregiver  Intervention: Optimize Psychosocial Wellbeing  Recent Flowsheet Documentation  Taken 3/9/2023 0853 by Hilda Hillman RN  Supportive Measures:   active listening utilized   self-care encouraged  Diversional Activities:   television   smartphone

## 2023-03-09 NOTE — DISCHARGE SUMMARY
Date of Discharge:  3/9/2023    Discharge Diagnosis:   **Mastitis [N61.0]   Ulcerative colitis (HCC) [K51.90]   Leukocytosis [D72.829]       Presenting Problem/History of Present Illness  Active Hospital Problems    Diagnosis  POA   • **Mastitis [N61.0]  Yes   • Ulcerative colitis (HCC) [K51.90]  Yes   • Leukocytosis [D72.829]  Yes      Resolved Hospital Problems   No resolved problems to display.          Hospital Course  Patient is a 42 y.o. female presented with 1 day history of left breast pain, firmness, erythema.  There was no known trauma.  She had no prior history of mastitis.  She is not pregnant or breast-feeding.  She is not immunosuppressed.  She was diagnosed with left breast mastitis.  Ultrasound did not show any fluid collection or abscess formation.  She was started initially on Rocephin.  She showed some clinical improvement overnight.  She was afebrile and had mild leukocytosis.  She will be discharged home to complete an oral course of Keflex.  She will notify our office if symptoms are worsening.  After the symptoms of mastitis have completely resolved she will need a diagnostic mammogram.  Patient understands the plan of care and is agreeable to discharge.    Procedures Performed         Consults:   Consults       No orders found for last 30 day(s).            Pertinent Test Results:    Lab Results (most recent)       Procedure Component Value Units Date/Time    Comprehensive Metabolic Panel [[id number]]  (Abnormal) Collected: 03/08/23 2021    Specimen: Blood Updated: 03/08/23 2058     Glucose 86 mg/dL      BUN 11 mg/dL      Creatinine 0.67 mg/dL      Sodium 137 mmol/L      Potassium 3.5 mmol/L      Chloride 101 mmol/L      CO2 27.1 mmol/L      Calcium 9.2 mg/dL      Total Protein 7.3 g/dL      Albumin 4.5 g/dL      ALT (SGPT) 7 U/L      AST (SGOT) 9 U/L      Alkaline Phosphatase 34 U/L      Total Bilirubin 0.2 mg/dL      Globulin 2.8 gm/dL      A/G Ratio 1.6 g/dL      BUN/Creatinine Ratio 16.4      Anion Gap 8.9 mmol/L      eGFR 112.8 mL/min/1.73     Narrative:      GFR Normal >60  Chronic Kidney Disease <60  Kidney Failure <15      Lactic Acid, Plasma [[id number]]  (Normal) Collected: 03/08/23 2021    Specimen: Blood Updated: 03/08/23 2054     Lactate 0.7 mmol/L     CBC & Differential [[id number]]  (Abnormal) Collected: 03/08/23 2021    Specimen: Blood Updated: 03/08/23 2035    Narrative:      The following orders were created for panel order CBC & Differential.  Procedure                               Abnormality         Status                     ---------                               -----------         ------                     CBC Auto Differential[[id number]]        Abnormal            Final result                 Please view results for these tests on the individual orders.    CBC Auto Differential [[id number]]  (Abnormal) Collected: 03/08/23 2021    Specimen: Blood Updated: 03/08/23 2035     WBC 15.81 10*3/mm3      RBC 4.41 10*6/mm3      Hemoglobin 13.0 g/dL      Hematocrit 40.5 %      MCV 91.8 fL      MCH 29.5 pg      MCHC 32.1 g/dL      RDW 12.6 %      RDW-SD 43.2 fl      MPV 10.0 fL      Platelets 303 10*3/mm3      Neutrophil % 79.0 %      Lymphocyte % 14.4 %      Monocyte % 5.9 %      Eosinophil % 0.4 %      Basophil % 0.1 %      Immature Grans % 0.2 %      Neutrophils, Absolute 12.48 10*3/mm3      Lymphocytes, Absolute 2.28 10*3/mm3      Monocytes, Absolute 0.94 10*3/mm3      Eosinophils, Absolute 0.06 10*3/mm3      Basophils, Absolute 0.02 10*3/mm3      Immature Grans, Absolute 0.03 10*3/mm3     Blood Culture - Blood, Arm, Left [[id number]] Collected: 03/08/23 2021    Specimen: Blood from Arm, Left Updated: 03/08/23 2032    Blood Culture - Blood, Arm, Left [[id number]] Collected: 03/08/23 2021    Specimen: Blood from Arm, Left Updated: 03/08/23 2032                         Condition on Discharge: Stable    Vital Signs  Temp:  [98.3 °F (36.8 °C)-99.1 °F (37.3 °C)] 98.3 °F (36.8 °C)  Heart Rate:   [77-92] 77  Resp:  [14-16] 16  BP: ()/(57-93) 104/71    Physical Exam:     General Appearance:    Alert, cooperative, in no acute distress   Head:    Normocephalic, without obvious abnormality, atraumatic   Eyes:            Lids and lashes normal, conjunctivae and sclerae normal, no   icterus, no pallor, corneas clear, PERRLA   Ears:    Ears appear intact with no abnormalities noted   Throat:   No oral lesions, no thrush, oral mucosa moist   Neck:   No adenopathy, supple, trachea midline, no thyromegaly, no   carotid bruit, no JVD   Lungs:     Clear to auscultation,respirations regular, even and                  unlabored    Heart:    Regular rhythm and normal rate, normal S1 and S2, no            murmur, no gallop, no rub, no click   Chest Wall:   Left breast-induration and erythema involving bilateral lower quadrants; there is no nipple retraction, no peau d'orange, no fluctuance   Abdomen:     Normal bowel sounds, no masses, no organomegaly, soft        non-tender, non-distended, no guarding, no rebound                tenderness   Extremities:   Moves all extremities well, no edema, no cyanosis, no             redness   Pulses:   Pulses palpable and equal bilaterally   Skin:   No bleeding, bruising or rash   Lymph nodes:   No palpable adenopathy   Neurologic:   Cranial nerves 2 - 12 grossly intact, sensation intact, DTR       present and equal bilaterally       Discharge Disposition  Home or Self Care    Discharge Medications     Discharge Medications        New Medications        Instructions Start Date   acetaminophen 325 MG tablet  Commonly known as: TYLENOL   650 mg, Oral, Every 4 Hours PRN      cephalexin 500 MG capsule  Commonly known as: Keflex   500 mg, Oral, 3 Times Daily      HYDROcodone-acetaminophen 5-325 MG per tablet  Commonly known as: NORCO   1 tablet, Oral, Every 6 Hours PRN             Continue These Medications        Instructions Start Date   ALPRAZolam 0.25 MG tablet  Commonly known as:  XANAX   0.25 mg, Oral, Nightly PRN      Budesonide 3 MG 24 hr capsule  Commonly known as: ENTOCORT EC   9 mg, Oral, Every Morning      buPROPion  MG 24 hr tablet  Commonly known as: WELLBUTRIN XL   150 mg, Oral, Daily      dicyclomine 20 MG tablet  Commonly known as: BENTYL   20 mg, Oral, Every 6 Hours      lidocaine 5 %  Commonly known as: LIDODERM   1 patch, Transdermal, Every 24 Hours Scheduled, Remove & Discard patch within 12 hours or as directed by MD      mesalamine 1.2 g EC tablet  Commonly known as: LIALDA   2.4 g, Oral, 2 Times Daily               Discharge Diet: Regular    Activity at Discharge: No restrictions    Follow-up Appointments  No future appointments.  Additional Instructions for the Follow-ups that You Need to Schedule       Discharge Follow-up with PCP   As directed       Currently Documented PCP:    Virgil Burrell MD    PCP Phone Number:    [phone]     Follow Up Details: [facility] will contact you with follow up appointment.                 Test Results Pending at Discharge  Pending Labs       Order Current Status    Blood Culture - Blood, Arm, Left In process    Blood Culture - Blood, Arm, Left In process             Anamika Greer MD  03/09/23  13:58 EST    Time: Discharge 25 min

## 2023-03-09 NOTE — FS[facility] PROVIDER NOTE
Subjective   History of Present Illness  42 yof complains of breast pain and tenderness. Pt states it started this morning and has progressively became worse. Pt denies fever, nausea or vomiting. Pt had a hysterectomy 2 years ago. Pt denies nipple drainage or discharge.          Review of Systems   Constitutional: Negative.    Cardiovascular:        Left breast swelling and tenderness   Gastrointestinal: Negative.    Skin:        Left breast erythema   All other systems reviewed and are negative.      Past Medical History:   Diagnosis Date   • Ulcerative colitis (HCC)        Allergies   Allergen Reactions   • Flagyl [Metronidazole] GI Intolerance       Past Surgical History:   Procedure Laterality Date   • HYSTERECTOMY         History reviewed. No pertinent family history.    Social History     Socioeconomic History   • Marital status:    Tobacco Use   • Smoking status: Never   • Smokeless tobacco: Never   Vaping Use   • Vaping Use: Never used   Substance and Sexual Activity   • Alcohol use: Never   • Drug use: Never   • Sexual activity: Defer           Objective   Physical Exam  Vitals reviewed.   Constitutional:       Appearance: Normal appearance.   HENT:      Head: Normocephalic and atraumatic.      Mouth/Throat:      Mouth: Mucous membranes are moist.      Pharynx: Oropharynx is clear.   Eyes:      Extraocular Movements: Extraocular movements intact.      Pupils: Pupils are equal, round, and reactive to light.   Cardiovascular:      Rate and Rhythm: Normal rate and regular rhythm.      Pulses: Normal pulses.      Heart sounds: Normal heart sounds.   Pulmonary:      Effort: Pulmonary effort is normal.      Breath sounds: Normal breath sounds.   Chest:   Breasts:     Left: Swelling, skin change and tenderness present. No nipple discharge.       Lymphadenopathy:      Upper Body:      Left upper body: No supraclavicular, axillary or pectoral adenopathy.   Neurological:      Mental Status: She is alert.          Procedures           ED Course                                           Medical Decision Making  This patient presents with initial presentation of local erythema, warmth, swelling concerning for cellulitis of the breast. Sensitivity/pain to light touch around the erythematous area. No lymphangitic spread visible.  Plan to admit for IV antibiotics and possible US guided FNA.     Cellulitis of female breast: chronic illness or injury  Amount and/or Complexity of Data Reviewed  Labs: ordered.      Risk  Prescription drug management.  Decision regarding hospitalization.          Final diagnoses:   Cellulitis of female breast       ED Disposition  ED Disposition     ED Disposition   Decision to Admit    Condition   --    Comment   Level of Care: Med/Surg [1]   Diagnosis: Cellulitis of female breast [[id number]]   Admitting Physician: VICKIE MARSHALL [[id number]]   Attending Physician: VICKIE MARSHALL [[id number]]   Certification: I Certify That Inpatient Hospital Services Are Medically Necessary For Greater Than 2 Midnights               No follow-up provider specified.       Medication List      No changes were made to your prescriptions during this visit.

## 2023-03-09 NOTE — PLAN OF CARE
Problem: Adult Inpatient Plan of Care  Goal: Plan of Care Review  Outcome: Ongoing, Progressing  Goal: Patient-Specific Goal (Individualized)  Outcome: Ongoing, Progressing  Goal: Absence of Hospital-Acquired Illness or Injury  Outcome: Ongoing, Progressing  Intervention: Identify and Manage Fall Risk  Recent Flowsheet Documentation  Taken 3/9/2023 0400 by Yulisa Palacio LPN  Safety Promotion/Fall Prevention: safety round/check completed  Intervention: Prevent Infection  Recent Flowsheet Documentation  Taken 3/9/2023 0400 by Yulisa Palacio LPN  Infection Prevention:   visitors restricted/screened   single patient room provided   rest/sleep promoted  Goal: Optimal Comfort and Wellbeing  Outcome: Ongoing, Progressing  Goal: Readiness for Transition of Care  Outcome: Ongoing, Progressing     Problem: Pain Acute  Goal: Acceptable Pain Control and Functional Ability  Outcome: Ongoing, Progressing  Intervention: Prevent or Manage Pain  Recent Flowsheet Documentation  Taken 3/9/2023 0400 by Yulisa Palacio LPN  Medication Review/Management: medications reviewed   Goal Outcome Evaluation:            Pt admitted for cellulitis, mild pain noted with norco given , effective, pt afebrile.

## 2023-03-10 NOTE — CASE MANAGEMENT/SOCIAL WORK
Case Management Discharge Note      Final Note: Home         Selected Continued Care - Discharged on 3/9/2023 Admission date: 3/8/2023 - Discharge disposition: Home or Self Care            Transportation Services  Private: Car    Final Discharge Disposition Code: 01 - home or self-care

## 2023-03-13 LAB
BACTERIA SPEC AEROBE CULT: NORMAL
BACTERIA SPEC AEROBE CULT: NORMAL

## 2023-03-15 ENCOUNTER — OFFICE (AMBULATORY)
Dept: URBAN - METROPOLITAN AREA PATHOLOGY 4 | Facility: PATHOLOGY | Age: 43
End: 2023-03-15
Payer: COMMERCIAL

## 2023-03-15 ENCOUNTER — ON CAMPUS - OUTPATIENT (AMBULATORY)
Dept: URBAN - METROPOLITAN AREA HOSPITAL 2 | Facility: HOSPITAL | Age: 43
End: 2023-03-15

## 2023-03-15 VITALS
DIASTOLIC BLOOD PRESSURE: 65 MMHG | SYSTOLIC BLOOD PRESSURE: 124 MMHG | HEART RATE: 86 BPM | HEART RATE: 71 BPM | HEART RATE: 68 BPM | OXYGEN SATURATION: 97 % | SYSTOLIC BLOOD PRESSURE: 118 MMHG | RESPIRATION RATE: 16 BRPM | SYSTOLIC BLOOD PRESSURE: 126 MMHG | OXYGEN SATURATION: 99 % | DIASTOLIC BLOOD PRESSURE: 66 MMHG | HEART RATE: 88 BPM | HEIGHT: 63 IN | DIASTOLIC BLOOD PRESSURE: 67 MMHG | DIASTOLIC BLOOD PRESSURE: 74 MMHG | HEART RATE: 77 BPM | OXYGEN SATURATION: 92 % | DIASTOLIC BLOOD PRESSURE: 89 MMHG | DIASTOLIC BLOOD PRESSURE: 83 MMHG | DIASTOLIC BLOOD PRESSURE: 84 MMHG | RESPIRATION RATE: 18 BRPM | HEART RATE: 83 BPM | DIASTOLIC BLOOD PRESSURE: 80 MMHG | SYSTOLIC BLOOD PRESSURE: 105 MMHG | SYSTOLIC BLOOD PRESSURE: 108 MMHG | OXYGEN SATURATION: 98 % | HEART RATE: 85 BPM | HEART RATE: 91 BPM | SYSTOLIC BLOOD PRESSURE: 111 MMHG | TEMPERATURE: 98.2 F | HEART RATE: 87 BPM | SYSTOLIC BLOOD PRESSURE: 102 MMHG | SYSTOLIC BLOOD PRESSURE: 125 MMHG | WEIGHT: 152 LBS

## 2023-03-15 DIAGNOSIS — K64.1 SECOND DEGREE HEMORRHOIDS: ICD-10-CM

## 2023-03-15 DIAGNOSIS — K51.00 ULCERATIVE (CHRONIC) PANCOLITIS WITHOUT COMPLICATIONS: ICD-10-CM

## 2023-03-15 DIAGNOSIS — K63.89 OTHER SPECIFIED DISEASES OF INTESTINE: ICD-10-CM

## 2023-03-15 DIAGNOSIS — K52.9 NONINFECTIVE GASTROENTERITIS AND COLITIS, UNSPECIFIED: ICD-10-CM

## 2023-03-15 LAB
GI HISTOLOGY: A. SELECT: (no result)
GI HISTOLOGY: B. SELECT: (no result)
GI HISTOLOGY: C. SELECT: (no result)
GI HISTOLOGY: PDF REPORT: (no result)

## 2023-03-15 PROCEDURE — 88305 TISSUE EXAM BY PATHOLOGIST: CPT | Performed by: INTERNAL MEDICINE

## 2023-03-15 PROCEDURE — 45380 COLONOSCOPY AND BIOPSY: CPT | Performed by: INTERNAL MEDICINE

## 2023-03-15 NOTE — SERVICENOTES
Minimal disease activity noted on left side, distal DC and sigmoid bx taken, NBI light was used,  no dysplastic lesion noted, pt will c/w rowasa enema

## 2023-03-15 NOTE — SERVICEHPINOTES
KARISSA ENRIQUEZ  is a  42  female   who presents today for a  Colonoscopy   for   the indications listed below. The updated Patient Profile was reviewed prior to the procedure, in conjunction with the Physical Exam, including medical conditions, surgical procedures, medications, allergies, family history and social history. See Physical Exam time stamp below for date and time of HPI completion.Pre-operatively, I reviewed the indication(s) for the procedure, the risks of the procedure [including but not limited to: unexpected bleeding possibly requiring hospitalization and/or unplanned repeat procedures, perforation possibly requiring surgical treatment, missed lesions and complications of sedation/MAC (also explained by anesthesia staff)]. I have evaluated the patient for risks associated with the planned anesthesia and the procedure to be performed and find the patient an acceptable candidate for IV sedation.Multiple opportunities were provided for any questions or concerns, and all questions were answered satisfactorily before any anesthesia was administered. We will proceed with the planned procedure.br

## 2023-09-07 ENCOUNTER — OFFICE (AMBULATORY)
Dept: URBAN - METROPOLITAN AREA CLINIC 64 | Facility: CLINIC | Age: 43
End: 2023-09-07

## 2023-09-07 VITALS
WEIGHT: 142 LBS | SYSTOLIC BLOOD PRESSURE: 128 MMHG | HEART RATE: 78 BPM | DIASTOLIC BLOOD PRESSURE: 86 MMHG | HEIGHT: 63 IN

## 2023-09-07 DIAGNOSIS — K51.00 ULCERATIVE (CHRONIC) PANCOLITIS WITHOUT COMPLICATIONS: ICD-10-CM

## 2023-09-07 PROCEDURE — 99213 OFFICE O/P EST LOW 20 MIN: CPT | Performed by: INTERNAL MEDICINE

## 2023-09-07 RX ORDER — BUDESONIDE 3 MG/1
CAPSULE ORAL
Qty: 90 | Refills: 3 | Status: ACTIVE
Start: 2023-09-07

## 2023-11-17 ENCOUNTER — OFFICE (AMBULATORY)
Dept: URBAN - METROPOLITAN AREA CLINIC 64 | Facility: CLINIC | Age: 43
End: 2023-11-17

## 2023-11-17 VITALS
HEIGHT: 63 IN | WEIGHT: 146 LBS | SYSTOLIC BLOOD PRESSURE: 135 MMHG | DIASTOLIC BLOOD PRESSURE: 98 MMHG | HEART RATE: 98 BPM

## 2023-11-17 DIAGNOSIS — K51.00 ULCERATIVE (CHRONIC) PANCOLITIS WITHOUT COMPLICATIONS: ICD-10-CM

## 2023-11-17 PROCEDURE — 99213 OFFICE O/P EST LOW 20 MIN: CPT | Performed by: INTERNAL MEDICINE

## 2023-11-17 RX ORDER — MESALAMINE 0.38 G/1
1.5 CAPSULE, EXTENDED RELEASE ORAL
Qty: 120 | Refills: 5 | Status: ACTIVE
Start: 2023-11-17

## 2023-11-18 PROBLEM — K51.00 ULCERATIVE COLITIS, UNIVERSAL: Status: ACTIVE | Noted: 2023-03-15

## 2025-02-03 ENCOUNTER — OFFICE (AMBULATORY)
Dept: URBAN - METROPOLITAN AREA CLINIC 64 | Facility: CLINIC | Age: 45
End: 2025-02-03
Payer: COMMERCIAL

## 2025-02-03 VITALS
WEIGHT: 159 LBS | DIASTOLIC BLOOD PRESSURE: 87 MMHG | SYSTOLIC BLOOD PRESSURE: 137 MMHG | HEIGHT: 63 IN | HEART RATE: 79 BPM

## 2025-02-03 DIAGNOSIS — K51.90 ULCERATIVE COLITIS, UNSPECIFIED, WITHOUT COMPLICATIONS: ICD-10-CM

## 2025-02-03 DIAGNOSIS — R14.2 ERUCTATION: ICD-10-CM

## 2025-02-03 PROCEDURE — 99214 OFFICE O/P EST MOD 30 MIN: CPT | Performed by: INTERNAL MEDICINE

## 2025-02-03 RX ORDER — MESALAMINE 4 G/60ML
ENEMA RECTAL
Qty: 30 | Refills: 6 | Status: ACTIVE
Start: 2025-02-03

## 2025-02-03 RX ORDER — BUDESONIDE 3 MG/1
CAPSULE ORAL
Qty: 180 | Refills: 3 | Status: ACTIVE
Start: 2025-02-03